# Patient Record
Sex: MALE | Race: WHITE | Employment: OTHER | URBAN - METROPOLITAN AREA
[De-identification: names, ages, dates, MRNs, and addresses within clinical notes are randomized per-mention and may not be internally consistent; named-entity substitution may affect disease eponyms.]

---

## 2022-07-06 ENCOUNTER — APPOINTMENT (OUTPATIENT)
Dept: GENERAL RADIOLOGY | Age: 57
End: 2022-07-06
Payer: COMMERCIAL

## 2022-07-06 ENCOUNTER — HOSPITAL ENCOUNTER (EMERGENCY)
Age: 57
Discharge: HOME | End: 2022-07-06
Attending: EMERGENCY MEDICINE
Payer: COMMERCIAL

## 2022-07-06 VITALS
BODY MASS INDEX: 34.65 KG/M2 | TEMPERATURE: 98 F | HEIGHT: 74 IN | DIASTOLIC BLOOD PRESSURE: 80 MMHG | SYSTOLIC BLOOD PRESSURE: 155 MMHG | WEIGHT: 270 LBS | HEART RATE: 90 BPM | RESPIRATION RATE: 16 BRPM | OXYGEN SATURATION: 95 %

## 2022-07-06 DIAGNOSIS — J98.6 PARALYSIS OF DIAPHRAGM: Primary | ICD-10-CM

## 2022-07-06 LAB
ALBUMIN SERPL-MCNC: 4.5 G/DL (ref 3.5–5.2)
ALP BLD-CCNC: 64 U/L (ref 40–129)
ALT SERPL-CCNC: 19 U/L (ref 0–40)
ANION GAP SERPL CALCULATED.3IONS-SCNC: 13 MMOL/L (ref 7–16)
AST SERPL-CCNC: 22 U/L (ref 0–39)
BASOPHILS ABSOLUTE: 0.04 E9/L (ref 0–0.2)
BASOPHILS RELATIVE PERCENT: 0.6 % (ref 0–2)
BILIRUB SERPL-MCNC: 0.3 MG/DL (ref 0–1.2)
BUN BLDV-MCNC: 27 MG/DL (ref 6–20)
CALCIUM SERPL-MCNC: 9.9 MG/DL (ref 8.6–10.2)
CHLORIDE BLD-SCNC: 107 MMOL/L (ref 98–107)
CO2: 23 MMOL/L (ref 22–29)
CREAT SERPL-MCNC: 1.2 MG/DL (ref 0.7–1.2)
D DIMER: <200 NG/ML DDU
EKG ATRIAL RATE: 91 BPM
EKG P AXIS: 65 DEGREES
EKG P-R INTERVAL: 240 MS
EKG Q-T INTERVAL: 388 MS
EKG QRS DURATION: 128 MS
EKG QTC CALCULATION (BAZETT): 477 MS
EKG R AXIS: 19 DEGREES
EKG T AXIS: 43 DEGREES
EKG VENTRICULAR RATE: 91 BPM
EOSINOPHILS ABSOLUTE: 0.21 E9/L (ref 0.05–0.5)
EOSINOPHILS RELATIVE PERCENT: 3.1 % (ref 0–6)
GFR AFRICAN AMERICAN: >60
GFR NON-AFRICAN AMERICAN: >60 ML/MIN/1.73
GLUCOSE BLD-MCNC: 146 MG/DL (ref 74–99)
HCT VFR BLD CALC: 46.4 % (ref 37–54)
HEMOGLOBIN: 15.3 G/DL (ref 12.5–16.5)
IMMATURE GRANULOCYTES #: 0.02 E9/L
IMMATURE GRANULOCYTES %: 0.3 % (ref 0–5)
LYMPHOCYTES ABSOLUTE: 1.9 E9/L (ref 1.5–4)
LYMPHOCYTES RELATIVE PERCENT: 27.8 % (ref 20–42)
MCH RBC QN AUTO: 30.3 PG (ref 26–35)
MCHC RBC AUTO-ENTMCNC: 33 % (ref 32–34.5)
MCV RBC AUTO: 91.9 FL (ref 80–99.9)
MONOCYTES ABSOLUTE: 0.69 E9/L (ref 0.1–0.95)
MONOCYTES RELATIVE PERCENT: 10.1 % (ref 2–12)
NEUTROPHILS ABSOLUTE: 3.98 E9/L (ref 1.8–7.3)
NEUTROPHILS RELATIVE PERCENT: 58.1 % (ref 43–80)
PDW BLD-RTO: 12.9 FL (ref 11.5–15)
PLATELET # BLD: 260 E9/L (ref 130–450)
PMV BLD AUTO: 10.6 FL (ref 7–12)
POTASSIUM SERPL-SCNC: 4.3 MMOL/L (ref 3.5–5)
RBC # BLD: 5.05 E12/L (ref 3.8–5.8)
SARS-COV-2, NAAT: NOT DETECTED
SODIUM BLD-SCNC: 143 MMOL/L (ref 132–146)
TOTAL PROTEIN: 7.6 G/DL (ref 6.4–8.3)
TROPONIN, HIGH SENSITIVITY: 12 NG/L (ref 0–11)
TROPONIN, HIGH SENSITIVITY: 12 NG/L (ref 0–11)
WBC # BLD: 6.8 E9/L (ref 4.5–11.5)

## 2022-07-06 PROCEDURE — 99285 EMERGENCY DEPT VISIT HI MDM: CPT

## 2022-07-06 PROCEDURE — 85025 COMPLETE CBC W/AUTO DIFF WBC: CPT

## 2022-07-06 PROCEDURE — 80053 COMPREHEN METABOLIC PANEL: CPT

## 2022-07-06 PROCEDURE — 94664 DEMO&/EVAL PT USE INHALER: CPT

## 2022-07-06 PROCEDURE — 71046 X-RAY EXAM CHEST 2 VIEWS: CPT

## 2022-07-06 PROCEDURE — 87635 SARS-COV-2 COVID-19 AMP PRB: CPT

## 2022-07-06 PROCEDURE — 93005 ELECTROCARDIOGRAM TRACING: CPT | Performed by: NURSE PRACTITIONER

## 2022-07-06 PROCEDURE — 84484 ASSAY OF TROPONIN QUANT: CPT

## 2022-07-06 PROCEDURE — 6370000000 HC RX 637 (ALT 250 FOR IP): Performed by: STUDENT IN AN ORGANIZED HEALTH CARE EDUCATION/TRAINING PROGRAM

## 2022-07-06 PROCEDURE — 85378 FIBRIN DEGRADE SEMIQUANT: CPT

## 2022-07-06 RX ORDER — INSULIN DEGLUDEC INJECTION 100 U/ML
INJECTION, SOLUTION SUBCUTANEOUS
COMMUNITY

## 2022-07-06 RX ORDER — ATORVASTATIN CALCIUM 20 MG/1
20 TABLET, FILM COATED ORAL DAILY
COMMUNITY

## 2022-07-06 RX ORDER — ICOSAPENT ETHYL 1000 MG/1
2 CAPSULE ORAL 2 TIMES DAILY
COMMUNITY

## 2022-07-06 RX ORDER — ALBUTEROL SULFATE 90 UG/1
2 AEROSOL, METERED RESPIRATORY (INHALATION) 4 TIMES DAILY PRN
Qty: 18 G | Refills: 0 | Status: SHIPPED | OUTPATIENT
Start: 2022-07-06

## 2022-07-06 RX ORDER — VALSARTAN 160 MG/1
160 TABLET ORAL DAILY
COMMUNITY

## 2022-07-06 RX ORDER — FENOFIBRATE 160 MG/1
135 TABLET ORAL DAILY
COMMUNITY

## 2022-07-06 RX ORDER — IPRATROPIUM BROMIDE AND ALBUTEROL SULFATE 2.5; .5 MG/3ML; MG/3ML
1 SOLUTION RESPIRATORY (INHALATION)
Status: DISCONTINUED | OUTPATIENT
Start: 2022-07-06 | End: 2022-07-06 | Stop reason: HOSPADM

## 2022-07-06 RX ADMIN — IPRATROPIUM BROMIDE AND ALBUTEROL SULFATE 1 AMPULE: 2.5; .5 SOLUTION RESPIRATORY (INHALATION) at 15:33

## 2022-07-06 ASSESSMENT — ENCOUNTER SYMPTOMS
SHORTNESS OF BREATH: 1
SORE THROAT: 0
SINUS PRESSURE: 0
SINUS PAIN: 0
NAUSEA: 0
ABDOMINAL PAIN: 0
CONSTIPATION: 0
VOMITING: 0
CHEST TIGHTNESS: 0
EYE PAIN: 0
DIARRHEA: 0
EYE REDNESS: 0
COUGH: 1

## 2022-07-06 ASSESSMENT — PAIN - FUNCTIONAL ASSESSMENT: PAIN_FUNCTIONAL_ASSESSMENT: NONE - DENIES PAIN

## 2022-07-06 NOTE — ED PROVIDER NOTES
Piyush Garcia [address] [address]  Department of Emergency Medicine     Written by: Micky Davis DO  Patient Name: Wyatt Eisenmenger  Attending Provider: Tutu Newman MD  Admit Date: 2022  1:45 PM  MRN: [id number]                   : 1965        Chief Complaint   Patient presents with    Shortness of Breath     sob since April, worsening over the weekend, recently started z pack, pt states spo2 80% this weekend    - Chief complaint    Mr. Calin Cartwright is a 60year old male who presents to the ED due to shortness of breath. Patient states he has had progressively worsening shortness of breath since April. Notes that he also began having right-sided neck pain around the same time as his shortness of breath began. He says that he took a trip to [facility] and mid  and his symptoms were persistent throughout the trip. He states that they progressively worsened over the weekend and he was prescribed a 3-day Z-Noe which did not improve his symptoms. States that he has been using his wife's old inhaler with minor improvement in his symptoms. Patient endorses a mild cough that began over the past weekend. Patient symptoms been constant since onset. States they are aggravated when he lays on his left side and back as well as with exertion and relieved by nothing. Denies any fevers, chills, nausea, vomiting, chest pain, abdominal pain, bowel urinary changes, headaches or vision changes. Review of Systems   Constitutional: Negative for chills, fatigue and fever. HENT: Negative for congestion, sinus pressure, sinus pain and sore throat. Eyes: Negative for pain, redness and visual disturbance. Respiratory: Positive for cough and shortness of breath. Negative for chest tightness. Cardiovascular: Negative for chest pain, palpitations and leg swelling. Gastrointestinal: Negative for abdominal pain, constipation, diarrhea, nausea and vomiting.    Genitourinary: Negative for dysuria, flank pain, frequency, hematuria and urgency. Musculoskeletal: Positive for neck pain (Right sided). Negative for joint swelling. Skin: Negative for rash. Neurological: Negative for dizziness, tremors, weakness, light-headedness, numbness and headaches. Physical Exam  Constitutional:       General: He is not in acute distress. Appearance: Normal appearance. He is well-developed and normal weight. He is not ill-appearing. HENT:      Head: Normocephalic and atraumatic. Right Ear: External ear normal.      Left Ear: External ear normal.      Mouth/Throat:      Mouth: Mucous membranes are moist.      Pharynx: Oropharynx is clear. Eyes:      Extraocular Movements: Extraocular movements intact. Conjunctiva/sclera: Conjunctivae normal.   Neck:      Comments: Right paraspinal neck tenderness  Cardiovascular:      Rate and Rhythm: Regular rhythm. Tachycardia present. Pulses: Normal pulses. Heart sounds: Normal heart sounds. Pulmonary:      Effort: Pulmonary effort is normal. No tachypnea or respiratory distress. Breath sounds: Wheezing present. No decreased breath sounds. Abdominal:      General: Abdomen is flat. Bowel sounds are normal.      Palpations: Abdomen is soft. Tenderness: There is no abdominal tenderness. There is no guarding or rebound. Musculoskeletal:         General: Normal range of motion. Cervical back: Normal range of motion and neck supple. Right lower leg: No edema. Left lower leg: No edema. Skin:     General: Skin is warm and dry. Neurological:      General: No focal deficit present. Mental Status: He is alert and oriented to person, place, and time. Mental status is at baseline. Motor: No weakness. Psychiatric:         Mood and Affect: Mood normal.         Behavior: Behavior normal.          Procedures     BEDSIDE LUNG ULTRASOUND    Risks, benefits and alternatives (for applicable procedures below) described.    Performed By: Luke Becerra MD and Bri Torres DO. Indication: X-ray indicating diaphragmatic paralysis  Informed consent: The patient provided verbal consent for this procedure. .  Procedural Quadrants/Interpretations:     Right Diaphragm: Limited excursion  Left Diaphragm: Normal excursion      MDM  Number of Diagnoses or Management Options  Paralysis of diaphragm  Diagnosis management comments: This is a 60year old male who presents to the ED due to shortness of breath. Patient CBC and CMP were both markedly benign within normal limits. Initial and repeat troponin were both 12. D-dimer is less than 200. Patient's COVID test was negative. Chest x-ray revealed an elevated right hemidiaphragm which may relate to eventration or paralysis with adjacent mild atelectasis and a calcified granuloma in the left lung. Bedside ultrasound was obtained which revealed limited excursion of the right diaphragm when compared to the left. Dr. Karen Scott was covering for patient's PCP and was informed of these findings and the necessity for patient to receive an outpatient cervical MRI. Patient was not hypoxic throughout his stay and appeared to be in no acute distress throughout his time in the ED. Patient received a DuoNeb treatment due to minor diffuse wheezing and reported some initial relief of his symptoms. He will be sent home with an albuterol inhaler for symptomatic relief. He has been told to be sure to follow-up with his primary care physician on Monday when he returns from vacation. He has been informed to come back to the ED if symptoms return, worsen or change anytime.              ED Course as of 07/06/22 1612   Wed Jul 06, 2022   5107 Spoke with Dr. Dalila Frausto for Dr. Juan Yung who stated he will be back in town on Monday and to schedule a follow up appointment for then to schedule his outpatient cervical MRI. [JS]      ED Course User Index  [JS] Bri Torres DO       --------------------------------------------- PAST HISTORY ---------------------------------------------  Past Medical History:  has no past medical history on file. Past Surgical History:  has no past surgical history on file. Social History:  reports that he has never smoked. He has never used smokeless tobacco.    Family History: family history is not on file. The patients home medications have been reviewed. Allergies: Patient has no known allergies. -------------------------------------------------- RESULTS -------------------------------------------------  EKG: This EKG is signed and interpreted by me.     Rate: 91  Rhythm: Sinus  Interpretation: 1st degree AV block  Comparison: no previous EKG available    Labs:  Results for orders placed or performed during the hospital encounter of 07/06/22   COVID-19, Rapid    Specimen: Nasopharyngeal Swab   Result Value Ref Range    SARS-CoV-2, NAAT Not Detected Not Detected   CBC with Auto Differential   Result Value Ref Range    WBC 6.8 4.5 - 11.5 E9/L    RBC 5.05 3.80 - 5.80 E12/L    Hemoglobin 15.3 12.5 - 16.5 g/dL    Hematocrit 46.4 37.0 - 54.0 %    MCV 91.9 80.0 - 99.9 fL    MCH 30.3 26.0 - 35.0 pg    MCHC 33.0 32.0 - 34.5 %    RDW 12.9 11.5 - 15.0 fL    Platelets 125 925 - 496 E9/L    MPV 10.6 7.0 - 12.0 fL    Neutrophils % 58.1 43.0 - 80.0 %    Immature Granulocytes % 0.3 0.0 - 5.0 %    Lymphocytes % 27.8 20.0 - 42.0 %    Monocytes % 10.1 2.0 - 12.0 %    Eosinophils % 3.1 0.0 - 6.0 %    Basophils % 0.6 0.0 - 2.0 %    Neutrophils Absolute 3.98 1.80 - 7.30 E9/L    Immature Granulocytes # 0.02 E9/L    Lymphocytes Absolute 1.90 1.50 - 4.00 E9/L    Monocytes Absolute 0.69 0.10 - 0.95 E9/L    Eosinophils Absolute 0.21 0.05 - 0.50 E9/L    Basophils Absolute 0.04 0.00 - 0.20 E9/L   Comprehensive Metabolic Panel   Result Value Ref Range    Sodium 143 132 - 146 mmol/L    Potassium 4.3 3.5 - 5.0 mmol/L    Chloride 107 98 - 107 mmol/L    CO2 23 22 - 29 mmol/L    Anion Gap 13 7 - 16 mmol/L    Glucose 146 (H) 74 - 99 mg/dL    BUN 27 (H) 6 - 20 mg/dL    CREATININE 1.2 0.7 - 1.2 mg/dL    GFR Non-African American >60 >=60 mL/min/1.73    GFR African American >60     Calcium 9.9 8.6 - 10.2 mg/dL    Total Protein 7.6 6.4 - 8.3 g/dL    Albumin 4.5 3.5 - 5.2 g/dL    Total Bilirubin 0.3 0.0 - 1.2 mg/dL    Alkaline Phosphatase 64 40 - 129 U/L    ALT 19 0 - 40 U/L    AST 22 0 - 39 U/L   Troponin   Result Value Ref Range    Troponin, High Sensitivity 12 (H) 0 - 11 ng/L   D-Dimer, Quantitative   Result Value Ref Range    D-Dimer, Quant <200 ng/mL DDU   Troponin   Result Value Ref Range    Troponin, High Sensitivity 12 (H) 0 - 11 ng/L   EKG 12 Lead   Result Value Ref Range    Ventricular Rate 91 BPM    Atrial Rate 91 BPM    P-R Interval 240 ms    QRS Duration 128 ms    Q-T Interval 388 ms    QTc Calculation (Bazett) 477 ms    P Axis 65 degrees    R Axis 19 degrees    T Axis 43 degrees       Radiology:  XR CHEST (2 VW)   Final Result   Elevated right hemidiaphragm which may relate to eventration and or   paralysis. Adjacent mild atelectasis. Calcified granuloma in the left lung.             ------------------------- NURSING NOTES AND VITALS REVIEWED ---------------------------  Date / Time Roomed:  7/6/2022  1:45 PM  ED Bed Assignment:  13/13    The nursing notes within the ED encounter and vital signs as below have been reviewed. BP (!) 155/80   Pulse 90   Temp 98 °F (36.7 °C) (Oral)   Resp 16   Ht 6' 2\" (1.88 m)   Wt 270 lb (122.5 kg)   SpO2 95%   BMI 34.67 kg/m²   Oxygen Saturation Interpretation: Normal      ------------------------------------------ PROGRESS NOTES ------------------------------------------  4:12 PM EDT  I have spoken with the patient and discussed todays results, in addition to providing specific details for the plan of care and counseling regarding the diagnosis and prognosis. Their questions are answered at this time and they are agreeable with the plan.  I discussed at length with them reasons for immediate return here for re evaluation. They will followup with their primary care physician by calling their office on Monday.      --------------------------------- ADDITIONAL PROVIDER NOTES ---------------------------------  At this time the patient is without objective evidence of an acute process requiring hospitalization or inpatient management. They have remained hemodynamically stable throughout their entire ED visit and are stable for discharge with outpatient follow-up. The plan has been discussed in detail and they are aware of the specific conditions for emergent return, as well as the importance of follow-up. Discharge Medication List as of 7/6/2022  3:35 PM          Diagnosis:  1. Paralysis of diaphragm        Disposition:  Patient's disposition: Discharge to home  Patient's condition is stable. Patient was seen and evaluated by myself and my attending Chris Odom MD. Assessment and Plan discussed with attending provider, please see attestation for final plan of care.      DO Bri Edmond DO  Resident  07/06/22 9133

## 2022-07-06 NOTE — ED NOTES
Department of Emergency Medicine  FIRST PROVIDER TRIAGE NOTE             Independent MLP           7/6/22  11:01 AM EDT    Date of Encounter: 7/6/22   MRN: [id number]      HPI: Cruz Fischre is a 60 y.o. male who presents to the ED for Shortness of Breath (sob since April, worsening over the weekend, recently started z pack, pt states spo2 80% this weekend)    ROS: Negative for abd pain or back pain. PE: Gen Appearance/Constitutional: alert  Musculoskeletal: moves all extremities x 4     Initial Plan of Care: All treatment areas with department are currently occupied. Plan to order/Initiate the following while awaiting opening in ED: labs, EKG and imaging studies.   Initiate Treatment-Testing, Proceed toTreatment Area When Bed Available for ED Attending/SAMANTHAP to Continue Care    Electronically signed by JOHN Otoole CNP   DD: 7/6/22       JOHN Borjas CNP  07/06/22 1106

## 2023-10-14 ENCOUNTER — HOSPITAL ENCOUNTER (OUTPATIENT)
Age: 58
Setting detail: OBSERVATION
Discharge: HOME | End: 2023-10-15
Attending: EMERGENCY MEDICINE | Admitting: STUDENT IN AN ORGANIZED HEALTH CARE EDUCATION/TRAINING PROGRAM
Payer: COMMERCIAL

## 2023-10-14 ENCOUNTER — APPOINTMENT (OUTPATIENT)
Dept: GENERAL RADIOLOGY | Age: 58
End: 2023-10-14
Payer: COMMERCIAL

## 2023-10-14 DIAGNOSIS — I24.9 ACUTE CORONARY SYNDROME (HCC): Primary | ICD-10-CM

## 2023-10-14 LAB
ALBUMIN SERPL-MCNC: 4.2 G/DL (ref 3.5–5.2)
ALP SERPL-CCNC: 54 U/L (ref 40–129)
ALT SERPL-CCNC: 26 U/L (ref 0–40)
ANION GAP SERPL CALCULATED.3IONS-SCNC: 12 MMOL/L (ref 7–16)
AST SERPL-CCNC: 32 U/L (ref 0–39)
BASOPHILS # BLD: 0.03 K/UL (ref 0–0.2)
BASOPHILS NFR BLD: 0 % (ref 0–2)
BILIRUB SERPL-MCNC: 0.4 MG/DL (ref 0–1.2)
BUN SERPL-MCNC: 24 MG/DL (ref 6–23)
CALCIUM SERPL-MCNC: 9.4 MG/DL (ref 8.6–10.2)
CHLORIDE SERPL-SCNC: 105 MMOL/L (ref 98–107)
CO2 SERPL-SCNC: 24 MMOL/L (ref 22–29)
CREAT SERPL-MCNC: 1.5 MG/DL (ref 0.7–1.2)
EOSINOPHIL # BLD: 0.13 K/UL (ref 0.05–0.5)
EOSINOPHILS RELATIVE PERCENT: 2 % (ref 0–6)
ERYTHROCYTE [DISTWIDTH] IN BLOOD BY AUTOMATED COUNT: 13.2 % (ref 11.5–15)
GFR SERPL CREATININE-BSD FRML MDRD: 53 ML/MIN/1.73M2
GLUCOSE SERPL-MCNC: 144 MG/DL (ref 74–99)
HCT VFR BLD AUTO: 44.8 % (ref 37–54)
HGB BLD-MCNC: 14.6 G/DL (ref 12.5–16.5)
IMM GRANULOCYTES # BLD AUTO: <0.03 K/UL (ref 0–0.58)
IMM GRANULOCYTES NFR BLD: 0 % (ref 0–5)
INR PPP: 1
LYMPHOCYTES NFR BLD: 1.8 K/UL (ref 1.5–4)
LYMPHOCYTES RELATIVE PERCENT: 22 % (ref 20–42)
MCH RBC QN AUTO: 30.6 PG (ref 26–35)
MCHC RBC AUTO-ENTMCNC: 32.6 G/DL (ref 32–34.5)
MCV RBC AUTO: 93.9 FL (ref 80–99.9)
MONOCYTES NFR BLD: 1.06 K/UL (ref 0.1–0.95)
MONOCYTES NFR BLD: 13 % (ref 2–12)
NEUTROPHILS NFR BLD: 63 % (ref 43–80)
NEUTS SEG NFR BLD: 5.18 K/UL (ref 1.8–7.3)
PARTIAL THROMBOPLASTIN TIME: 33.9 SEC (ref 24.5–35.1)
PARTIAL THROMBOPLASTIN TIME: 52.8 SEC (ref 24.5–35.1)
PLATELET # BLD AUTO: 251 K/UL (ref 130–450)
PMV BLD AUTO: 10.6 FL (ref 7–12)
POTASSIUM SERPL-SCNC: 4.3 MMOL/L (ref 3.5–5)
PROT SERPL-MCNC: 7.1 G/DL (ref 6.4–8.3)
PROTHROMBIN TIME: 10.5 SEC (ref 9.3–12.4)
RBC # BLD AUTO: 4.77 M/UL (ref 3.8–5.8)
SODIUM SERPL-SCNC: 141 MMOL/L (ref 132–146)
TROPONIN I SERPL HS-MCNC: 17 NG/L (ref 0–11)
TROPONIN I SERPL HS-MCNC: 17 NG/L (ref 0–11)
WBC OTHER # BLD: 8.2 K/UL (ref 4.5–11.5)

## 2023-10-14 PROCEDURE — 2580000003 HC RX 258: Performed by: EMERGENCY MEDICINE

## 2023-10-14 PROCEDURE — 93005 ELECTROCARDIOGRAM TRACING: CPT | Performed by: EMERGENCY MEDICINE

## 2023-10-14 PROCEDURE — 71045 X-RAY EXAM CHEST 1 VIEW: CPT

## 2023-10-14 PROCEDURE — 6370000000 HC RX 637 (ALT 250 FOR IP): Performed by: NURSE PRACTITIONER

## 2023-10-14 PROCEDURE — 2580000003 HC RX 258: Performed by: NURSE PRACTITIONER

## 2023-10-14 PROCEDURE — 96374 THER/PROPH/DIAG INJ IV PUSH: CPT

## 2023-10-14 PROCEDURE — 96375 TX/PRO/DX INJ NEW DRUG ADDON: CPT

## 2023-10-14 PROCEDURE — 85610 PROTHROMBIN TIME: CPT

## 2023-10-14 PROCEDURE — 85730 THROMBOPLASTIN TIME PARTIAL: CPT

## 2023-10-14 PROCEDURE — 85025 COMPLETE CBC W/AUTO DIFF WBC: CPT

## 2023-10-14 PROCEDURE — 96376 TX/PRO/DX INJ SAME DRUG ADON: CPT

## 2023-10-14 PROCEDURE — 84484 ASSAY OF TROPONIN QUANT: CPT

## 2023-10-14 PROCEDURE — 96368 THER/DIAG CONCURRENT INF: CPT

## 2023-10-14 PROCEDURE — 80053 COMPREHEN METABOLIC PANEL: CPT

## 2023-10-14 PROCEDURE — 96366 THER/PROPH/DIAG IV INF ADDON: CPT

## 2023-10-14 PROCEDURE — 6360000002 HC RX W HCPCS: Performed by: EMERGENCY MEDICINE

## 2023-10-14 PROCEDURE — G0378 HOSPITAL OBSERVATION PER HR: HCPCS

## 2023-10-14 PROCEDURE — 99285 EMERGENCY DEPT VISIT HI MDM: CPT

## 2023-10-14 PROCEDURE — 96365 THER/PROPH/DIAG IV INF INIT: CPT

## 2023-10-14 RX ORDER — HEPARIN SODIUM 1000 [USP'U]/ML
2000 INJECTION, SOLUTION INTRAVENOUS; SUBCUTANEOUS PRN
Status: DISCONTINUED | OUTPATIENT
Start: 2023-10-14 | End: 2023-10-15

## 2023-10-14 RX ORDER — SODIUM CHLORIDE 0.9 % (FLUSH) 0.9 %
10 SYRINGE (ML) INJECTION EVERY 12 HOURS SCHEDULED
Status: DISCONTINUED | OUTPATIENT
Start: 2023-10-14 | End: 2023-10-15 | Stop reason: HOSPADM

## 2023-10-14 RX ORDER — SODIUM CHLORIDE 0.9 % (FLUSH) 0.9 %
10 SYRINGE (ML) INJECTION PRN
Status: DISCONTINUED | OUTPATIENT
Start: 2023-10-14 | End: 2023-10-15 | Stop reason: HOSPADM

## 2023-10-14 RX ORDER — ACETAMINOPHEN 325 MG/1
650 TABLET ORAL EVERY 6 HOURS PRN
Status: DISCONTINUED | OUTPATIENT
Start: 2023-10-14 | End: 2023-10-15 | Stop reason: HOSPADM

## 2023-10-14 RX ORDER — HEPARIN SODIUM 1000 [USP'U]/ML
4000 INJECTION, SOLUTION INTRAVENOUS; SUBCUTANEOUS PRN
Status: DISCONTINUED | OUTPATIENT
Start: 2023-10-14 | End: 2023-10-15

## 2023-10-14 RX ORDER — ACETAMINOPHEN 650 MG/1
650 SUPPOSITORY RECTAL EVERY 6 HOURS PRN
Status: DISCONTINUED | OUTPATIENT
Start: 2023-10-14 | End: 2023-10-15 | Stop reason: HOSPADM

## 2023-10-14 RX ORDER — POTASSIUM CHLORIDE 7.45 MG/ML
10 INJECTION INTRAVENOUS PRN
Status: DISCONTINUED | OUTPATIENT
Start: 2023-10-14 | End: 2023-10-15 | Stop reason: HOSPADM

## 2023-10-14 RX ORDER — FENTANYL CITRATE 50 UG/ML
50 INJECTION, SOLUTION INTRAMUSCULAR; INTRAVENOUS ONCE
Status: COMPLETED | OUTPATIENT
Start: 2023-10-14 | End: 2023-10-14

## 2023-10-14 RX ORDER — POTASSIUM CHLORIDE 20 MEQ/1
40 TABLET, EXTENDED RELEASE ORAL PRN
Status: DISCONTINUED | OUTPATIENT
Start: 2023-10-14 | End: 2023-10-15 | Stop reason: HOSPADM

## 2023-10-14 RX ORDER — ONDANSETRON 4 MG/1
4 TABLET, ORALLY DISINTEGRATING ORAL EVERY 8 HOURS PRN
Status: DISCONTINUED | OUTPATIENT
Start: 2023-10-14 | End: 2023-10-15 | Stop reason: HOSPADM

## 2023-10-14 RX ORDER — ALBUTEROL SULFATE 2.5 MG/3ML
2.5 SOLUTION RESPIRATORY (INHALATION) 4 TIMES DAILY PRN
Status: DISCONTINUED | OUTPATIENT
Start: 2023-10-14 | End: 2023-10-15 | Stop reason: HOSPADM

## 2023-10-14 RX ORDER — ONDANSETRON 2 MG/ML
4 INJECTION INTRAMUSCULAR; INTRAVENOUS EVERY 6 HOURS PRN
Status: DISCONTINUED | OUTPATIENT
Start: 2023-10-14 | End: 2023-10-15 | Stop reason: HOSPADM

## 2023-10-14 RX ORDER — ASPIRIN 81 MG/1
324 TABLET, CHEWABLE ORAL ONCE
Status: DISCONTINUED | OUTPATIENT
Start: 2023-10-14 | End: 2023-10-14

## 2023-10-14 RX ORDER — 0.9 % SODIUM CHLORIDE 0.9 %
500 INTRAVENOUS SOLUTION INTRAVENOUS ONCE
Status: COMPLETED | OUTPATIENT
Start: 2023-10-14 | End: 2023-10-14

## 2023-10-14 RX ORDER — SODIUM CHLORIDE 9 MG/ML
INJECTION, SOLUTION INTRAVENOUS PRN
Status: DISCONTINUED | OUTPATIENT
Start: 2023-10-14 | End: 2023-10-15 | Stop reason: HOSPADM

## 2023-10-14 RX ORDER — HEPARIN SODIUM 10000 [USP'U]/100ML
5-30 INJECTION, SOLUTION INTRAVENOUS CONTINUOUS
Status: DISCONTINUED | OUTPATIENT
Start: 2023-10-14 | End: 2023-10-15

## 2023-10-14 RX ORDER — SENNOSIDES A AND B 8.6 MG/1
1 TABLET, FILM COATED ORAL DAILY PRN
Status: DISCONTINUED | OUTPATIENT
Start: 2023-10-14 | End: 2023-10-15 | Stop reason: HOSPADM

## 2023-10-14 RX ORDER — ASPIRIN 81 MG/1
81 TABLET, CHEWABLE ORAL DAILY
COMMUNITY

## 2023-10-14 RX ORDER — VALSARTAN 160 MG/1
160 TABLET ORAL DAILY
Status: DISCONTINUED | OUTPATIENT
Start: 2023-10-15 | End: 2023-10-15 | Stop reason: HOSPADM

## 2023-10-14 RX ORDER — HEPARIN SODIUM 1000 [USP'U]/ML
4000 INJECTION, SOLUTION INTRAVENOUS; SUBCUTANEOUS ONCE
Status: COMPLETED | OUTPATIENT
Start: 2023-10-14 | End: 2023-10-14

## 2023-10-14 RX ORDER — ICOSAPENT ETHYL 1000 MG/1
2 CAPSULE ORAL 2 TIMES DAILY
Status: DISCONTINUED | OUTPATIENT
Start: 2023-10-14 | End: 2023-10-15 | Stop reason: RX

## 2023-10-14 RX ORDER — NITROGLYCERIN 20 MG/100ML
5-200 INJECTION INTRAVENOUS CONTINUOUS
Status: DISCONTINUED | OUTPATIENT
Start: 2023-10-14 | End: 2023-10-15

## 2023-10-14 RX ORDER — FENOFIBRATE 54 MG/1
135 TABLET ORAL DAILY
Status: DISCONTINUED | OUTPATIENT
Start: 2023-10-15 | End: 2023-10-15 | Stop reason: HOSPADM

## 2023-10-14 RX ORDER — INSULIN GLARGINE 100 [IU]/ML
60 INJECTION, SOLUTION SUBCUTANEOUS DAILY
Status: DISCONTINUED | OUTPATIENT
Start: 2023-10-15 | End: 2023-10-15 | Stop reason: HOSPADM

## 2023-10-14 RX ORDER — ATORVASTATIN CALCIUM 40 MG/1
40 TABLET, FILM COATED ORAL DAILY
Status: DISCONTINUED | OUTPATIENT
Start: 2023-10-15 | End: 2023-10-15 | Stop reason: HOSPADM

## 2023-10-14 RX ORDER — MAGNESIUM SULFATE IN WATER 40 MG/ML
2000 INJECTION, SOLUTION INTRAVENOUS PRN
Status: DISCONTINUED | OUTPATIENT
Start: 2023-10-14 | End: 2023-10-15 | Stop reason: HOSPADM

## 2023-10-14 RX ADMIN — HEPARIN SODIUM 4000 UNITS: 1000 INJECTION INTRAVENOUS; SUBCUTANEOUS at 19:09

## 2023-10-14 RX ADMIN — ACETAMINOPHEN 650 MG: 325 TABLET ORAL at 21:52

## 2023-10-14 RX ADMIN — NITROGLYCERIN 5 MCG/MIN: 20 INJECTION INTRAVENOUS at 20:27

## 2023-10-14 RX ADMIN — FENTANYL CITRATE 50 MCG: 50 INJECTION INTRAMUSCULAR; INTRAVENOUS at 19:27

## 2023-10-14 RX ADMIN — HEPARIN SODIUM AND DEXTROSE 8 UNITS/KG/HR: 10000; 5 INJECTION INTRAVENOUS at 20:25

## 2023-10-14 RX ADMIN — Medication 10 ML: at 21:37

## 2023-10-14 RX ADMIN — SODIUM CHLORIDE 500 ML: 9 INJECTION, SOLUTION INTRAVENOUS at 19:28

## 2023-10-14 ASSESSMENT — PAIN DESCRIPTION - LOCATION
LOCATION: CHEST
LOCATION: CHEST
LOCATION: HEAD

## 2023-10-14 ASSESSMENT — PAIN SCALES - GENERAL
PAINLEVEL_OUTOF10: 0
PAINLEVEL_OUTOF10: 1
PAINLEVEL_OUTOF10: 3
PAINLEVEL_OUTOF10: 8
PAINLEVEL_OUTOF10: 3

## 2023-10-14 NOTE — ED NOTES
Time Heart Alert called:1902    Cardiology paged: [facility]    Cardiology call back:1904 here     Patient to Cath Lab:      Luda Najera  10/14/23 1912

## 2023-10-15 ENCOUNTER — APPOINTMENT (OUTPATIENT)
Dept: NUCLEAR MEDICINE | Age: 58
End: 2023-10-15
Payer: COMMERCIAL

## 2023-10-15 VITALS
HEART RATE: 86 BPM | TEMPERATURE: 98 F | SYSTOLIC BLOOD PRESSURE: 117 MMHG | BODY MASS INDEX: 36.78 KG/M2 | OXYGEN SATURATION: 95 % | RESPIRATION RATE: 20 BRPM | DIASTOLIC BLOOD PRESSURE: 84 MMHG | WEIGHT: 286.6 LBS | HEIGHT: 74 IN

## 2023-10-15 PROBLEM — I45.10 RIGHT BUNDLE BRANCH BLOCK: Status: ACTIVE | Noted: 2023-10-15

## 2023-10-15 LAB
ALBUMIN SERPL-MCNC: 3.7 G/DL (ref 3.5–5.2)
ALP SERPL-CCNC: 49 U/L (ref 40–129)
ALT SERPL-CCNC: 26 U/L (ref 0–40)
ANION GAP SERPL CALCULATED.3IONS-SCNC: 12 MMOL/L (ref 7–16)
AST SERPL-CCNC: 29 U/L (ref 0–39)
BASOPHILS # BLD: 0.03 K/UL (ref 0–0.2)
BASOPHILS NFR BLD: 1 % (ref 0–2)
BILIRUB SERPL-MCNC: 0.4 MG/DL (ref 0–1.2)
BUN SERPL-MCNC: 20 MG/DL (ref 6–23)
CALCIUM SERPL-MCNC: 8.9 MG/DL (ref 8.6–10.2)
CHLORIDE SERPL-SCNC: 106 MMOL/L (ref 98–107)
CHOLEST SERPL-MCNC: 159 MG/DL
CO2 SERPL-SCNC: 22 MMOL/L (ref 22–29)
CREAT SERPL-MCNC: 1.2 MG/DL (ref 0.7–1.2)
D DIMER: 246 NG/ML DDU (ref 0–232)
EKG ATRIAL RATE: 96 BPM
EKG P AXIS: 57 DEGREES
EKG P-R INTERVAL: 244 MS
EKG Q-T INTERVAL: 372 MS
EKG QRS DURATION: 150 MS
EKG QTC CALCULATION (BAZETT): 469 MS
EKG R AXIS: 34 DEGREES
EKG T AXIS: 40 DEGREES
EKG VENTRICULAR RATE: 96 BPM
EOSINOPHIL # BLD: 0.2 K/UL (ref 0.05–0.5)
EOSINOPHILS RELATIVE PERCENT: 3 % (ref 0–6)
ERYTHROCYTE [DISTWIDTH] IN BLOOD BY AUTOMATED COUNT: 13.2 % (ref 11.5–15)
GFR SERPL CREATININE-BSD FRML MDRD: >60 ML/MIN/1.73M2
GLUCOSE SERPL-MCNC: 99 MG/DL (ref 74–99)
HCT VFR BLD AUTO: 38.9 % (ref 37–54)
HDLC SERPL-MCNC: 44 MG/DL
HGB BLD-MCNC: 13 G/DL (ref 12.5–16.5)
IMM GRANULOCYTES # BLD AUTO: 0.03 K/UL (ref 0–0.58)
IMM GRANULOCYTES NFR BLD: 1 % (ref 0–5)
LDLC SERPL CALC-MCNC: 75 MG/DL
LYMPHOCYTES NFR BLD: 1.39 K/UL (ref 1.5–4)
LYMPHOCYTES RELATIVE PERCENT: 23 % (ref 20–42)
MCH RBC QN AUTO: 30.7 PG (ref 26–35)
MCHC RBC AUTO-ENTMCNC: 33.4 G/DL (ref 32–34.5)
MCV RBC AUTO: 91.7 FL (ref 80–99.9)
MONOCYTES NFR BLD: 0.75 K/UL (ref 0.1–0.95)
MONOCYTES NFR BLD: 13 % (ref 2–12)
NEUTROPHILS NFR BLD: 60 % (ref 43–80)
NEUTS SEG NFR BLD: 3.58 K/UL (ref 1.8–7.3)
PARTIAL THROMBOPLASTIN TIME: 40.3 SEC (ref 24.5–35.1)
PARTIAL THROMBOPLASTIN TIME: 45.8 SEC (ref 24.5–35.1)
PARTIAL THROMBOPLASTIN TIME: 47 SEC (ref 24.5–35.1)
PLATELET # BLD AUTO: 224 K/UL (ref 130–450)
PMV BLD AUTO: 10.6 FL (ref 7–12)
POTASSIUM SERPL-SCNC: 3.9 MMOL/L (ref 3.5–5)
PROT SERPL-MCNC: 6.3 G/DL (ref 6.4–8.3)
RBC # BLD AUTO: 4.24 M/UL (ref 3.8–5.8)
SODIUM SERPL-SCNC: 140 MMOL/L (ref 132–146)
TRIGL SERPL-MCNC: 199 MG/DL
TROPONIN I SERPL HS-MCNC: 16 NG/L (ref 0–11)
VLDLC SERPL CALC-MCNC: 40 MG/DL
WBC OTHER # BLD: 6 K/UL (ref 4.5–11.5)

## 2023-10-15 PROCEDURE — 96376 TX/PRO/DX INJ SAME DRUG ADON: CPT

## 2023-10-15 PROCEDURE — 85730 THROMBOPLASTIN TIME PARTIAL: CPT

## 2023-10-15 PROCEDURE — 3430000000 HC RX DIAGNOSTIC RADIOPHARMACEUTICAL: Performed by: RADIOLOGY

## 2023-10-15 PROCEDURE — 85379 FIBRIN DEGRADATION QUANT: CPT

## 2023-10-15 PROCEDURE — 6370000000 HC RX 637 (ALT 250 FOR IP): Performed by: NURSE PRACTITIONER

## 2023-10-15 PROCEDURE — 93017 CV STRESS TEST TRACING ONLY: CPT

## 2023-10-15 PROCEDURE — 2580000003 HC RX 258: Performed by: NURSE PRACTITIONER

## 2023-10-15 PROCEDURE — APPSS180 APP SPLIT SHARED TIME > 60 MINUTES: Performed by: CLINICAL NURSE SPECIALIST

## 2023-10-15 PROCEDURE — 85025 COMPLETE CBC W/AUTO DIFF WBC: CPT

## 2023-10-15 PROCEDURE — G0378 HOSPITAL OBSERVATION PER HR: HCPCS

## 2023-10-15 PROCEDURE — 93018 CV STRESS TEST I&R ONLY: CPT | Performed by: INTERNAL MEDICINE

## 2023-10-15 PROCEDURE — 80053 COMPREHEN METABOLIC PANEL: CPT

## 2023-10-15 PROCEDURE — 78452 HT MUSCLE IMAGE SPECT MULT: CPT

## 2023-10-15 PROCEDURE — 6370000000 HC RX 637 (ALT 250 FOR IP): Performed by: CLINICAL NURSE SPECIALIST

## 2023-10-15 PROCEDURE — 84484 ASSAY OF TROPONIN QUANT: CPT

## 2023-10-15 PROCEDURE — 96368 THER/DIAG CONCURRENT INF: CPT

## 2023-10-15 PROCEDURE — 78452 HT MUSCLE IMAGE SPECT MULT: CPT | Performed by: INTERNAL MEDICINE

## 2023-10-15 PROCEDURE — 93010 ELECTROCARDIOGRAM REPORT: CPT | Performed by: INTERNAL MEDICINE

## 2023-10-15 PROCEDURE — 96366 THER/PROPH/DIAG IV INF ADDON: CPT

## 2023-10-15 PROCEDURE — 36415 COLL VENOUS BLD VENIPUNCTURE: CPT

## 2023-10-15 PROCEDURE — 80061 LIPID PANEL: CPT

## 2023-10-15 PROCEDURE — 6360000002 HC RX W HCPCS: Performed by: EMERGENCY MEDICINE

## 2023-10-15 PROCEDURE — 93016 CV STRESS TEST SUPVJ ONLY: CPT | Performed by: INTERNAL MEDICINE

## 2023-10-15 PROCEDURE — A9500 TC99M SESTAMIBI: HCPCS | Performed by: RADIOLOGY

## 2023-10-15 RX ORDER — ASPIRIN 81 MG/1
81 TABLET, CHEWABLE ORAL DAILY
Status: DISCONTINUED | OUTPATIENT
Start: 2023-10-15 | End: 2023-10-15 | Stop reason: HOSPADM

## 2023-10-15 RX ORDER — IBUPROFEN 400 MG/1
400 TABLET ORAL ONCE
Status: DISCONTINUED | OUTPATIENT
Start: 2023-10-15 | End: 2023-10-15 | Stop reason: HOSPADM

## 2023-10-15 RX ORDER — TETRAKIS(2-METHOXYISOBUTYLISOCYANIDE)COPPER(I) TETRAFLUOROBORATE 1 MG/ML
30 INJECTION, POWDER, LYOPHILIZED, FOR SOLUTION INTRAVENOUS
Status: COMPLETED | OUTPATIENT
Start: 2023-10-15 | End: 2023-10-15

## 2023-10-15 RX ORDER — TETRAKIS(2-METHOXYISOBUTYLISOCYANIDE)COPPER(I) TETRAFLUOROBORATE 1 MG/ML
12 INJECTION, POWDER, LYOPHILIZED, FOR SOLUTION INTRAVENOUS
Status: COMPLETED | OUTPATIENT
Start: 2023-10-15 | End: 2023-10-15

## 2023-10-15 RX ADMIN — ATORVASTATIN CALCIUM 40 MG: 40 TABLET, FILM COATED ORAL at 09:57

## 2023-10-15 RX ADMIN — HEPARIN SODIUM 2000 UNITS: 1000 INJECTION INTRAVENOUS; SUBCUTANEOUS at 02:26

## 2023-10-15 RX ADMIN — FENOFIBRATE 135 MG: 54 TABLET ORAL at 09:59

## 2023-10-15 RX ADMIN — ASPIRIN 81 MG: 81 TABLET, CHEWABLE ORAL at 09:57

## 2023-10-15 RX ADMIN — Medication 30 MILLICURIE: at 13:14

## 2023-10-15 RX ADMIN — VALSARTAN 160 MG: 160 TABLET, FILM COATED ORAL at 09:58

## 2023-10-15 RX ADMIN — ACETAMINOPHEN 650 MG: 325 TABLET ORAL at 08:23

## 2023-10-15 RX ADMIN — Medication 10 ML: at 10:01

## 2023-10-15 RX ADMIN — Medication 12 MILLICURIE: at 11:29

## 2023-10-15 ASSESSMENT — PAIN DESCRIPTION - DESCRIPTORS: DESCRIPTORS: ACHING;POUNDING

## 2023-10-15 ASSESSMENT — PAIN DESCRIPTION - LOCATION: LOCATION: HEAD

## 2023-10-15 ASSESSMENT — PAIN SCALES - GENERAL: PAINLEVEL_OUTOF10: 3

## 2023-10-15 NOTE — CARE COORDINATION
10/15/2023discharge order noted. Sw spoke with floor nursing, no needs assessed at this time. RN to call if discharge needs should arise.   Electronically signed by TEMO Mclaughlin on 10/15/2023 at 2:50 PM

## 2023-10-15 NOTE — H&P
Internal Medicine History & Physical     Name: Abdoul Damico  : 1965  Chief Complaint: No chief complaint on file. Primary Care Physician: Xiang Brunner MD  Admission date: 10/14/2023  Date of service: 10/15/2023     History of Present Illness  James Allen is a 59y.o. year old male who presented with a chief complaint of chest pain. Presented to the ED at [facility] for chest pain radiating to both arms with associated shortness of breath and nausea, beginning 17 hours prior to arrival. No prior cardiac history noted. HS Trop 17 and 16. EKG reviewed by cardiology and ED providers for questionable abnormality I was told cardiology reviewed and advised there was no concern for STEMI. He was taken for cardiac stress testing today I did not see the patient as he was down for stress testing. Discussed with nursing staff please call me with any and all issues thank you. No past medical history on file. No past surgical history on file. No family history on file. Social History  Patient lives at home . At baseline patient ambulates with out assistance   Illicit drugs: Denies   TOBACCO:   reports that he has never smoked. He has never used smokeless tobacco.  ETOH:   has no history on file for alcohol use. Home Medications  Prior to Admission medications    Medication Sig Start Date End Date Taking?  Authorizing Provider   aspirin 81 MG chewable tablet Take 1 tablet by mouth daily   Yes Cammy Mcmanus MD   Icosapent Ethyl (VASCEPA) 1 g CAPS capsule Take 2 capsules by mouth 2 times daily    Cammy Mcmanus MD   atorvastatin (LIPITOR) 20 MG tablet Take 1 tablet by mouth daily    Cammy Mcmanus MD   valsartan (DIOVAN) 160 MG tablet Take 1 tablet by mouth daily    Cammy Mcmanus MD   fenofibrate (TRIGLIDE) 160 MG tablet Take 135 mg by mouth daily    Cammy Mcmanus MD   dapagliflozin (FARXIGA) 10 MG tablet Take 1 tablet by mouth every morning    Cammy Mcmanus

## 2023-10-15 NOTE — PROGRESS NOTES
Patient returned from stress test and is asking to leave. Cardiology messaged to confirm he can leave from their standpoint.

## 2023-10-15 NOTE — CONSULTS
Inpatient Cardiology Consultation      Reason for Consult:  Chest pain     Consulting Physician: Dr. Lavonne Guerra     Requesting Physician:  Dr. Lexy Joseph     Date of Consultation: 10/15/2023    History of Present Illness:   Mr. Dior De Anda is a 59year old gentleman who is previously unknown to our practice. He presented to the emergency room last night with chest discomfort  He is typically active, with a functional capacity of more than 4 METS and denies exertional chest discomfort. Last year, he had some intermittent dyspnea and had stress test and echocardiogram which were \"normal\" although he did not actually see a cardiologist. He was ultimately diagnosed with elevated right hemidiaphragm. He flew to [place] last week and while there walked around [employer] without any difficulty. Friday night, he went to bed around 11:00 and about thirty minutes later he started to have pressure across his chest and into both shoulders. He did not feel short of breath, but the pain was made worse with inspiration and somewhat improved when he sat up. He was able to sleep a few hours before flying home Saturday morning and while the pain continued, he was able to carry luggage through the airport,  his granddaughter, and lift his carry on onto the overhead compartment in the plane without worsening of his pain. Once he returned home, the pain became more severe again when he laid down. On arrival to the ER, he was hypertensive (167/78), pulse 101, and SpO2 92%. Initial EKG showed inferior ST elevation and he was evaluated by Dr. Lavonne Guerra. Serial EKGs were obtained which showed improvement in the elevation and it was felt the initial changes were there result of lead misplacement. He was started on intravenous Heparin and Nitroglycerin; BUN and Cr were 24 and 1.5 respectively, and serial hs-Ewa levels 17>>17>>16. Cr today has improved to 1.2. At this time, he is pain free but has not attempted to lie flat.  He reports

## 2023-10-15 NOTE — ED NOTES
Pts o2 was dropping to 89 put pt on 2 liters of 02 via nc pt states that is normal for him      Ana Matos, DANIEL  10/14/23 3471

## 2023-10-15 NOTE — PROCEDURES
Following placement of an intravenous catheter 10 millicuries of technetium 99m sestamibi was administered with resting SPECT images obtained approximately 45 minutes post injection. After completion of resting images, the patient exercised for 6:00 minutes on an accelerated Miky protocol treadmill achieving 10.1 METs of activity and 86% MPHR. Baseline tracing demonstrates sinus rhythm with a right bundle branch block. No anginal chest pain or cardiac arrhythmias were noted. A normal blood pressure response to exercise was recorded. No electrocardiographic changes were noted. One mintue prior to the completion of exercise 30 millicuries of technetium 99m sestamibi was injected with post stress SPECT images obtained approximately 30 minutes post stress. Perfusion images pending.

## 2023-10-15 NOTE — CARE COORDINATION
Internal Medicine On-call Care Coordination Note    I was called by the ED physician because they recommended admission for this patient and we cover their PCP. The history as I understand it after discussion with the ED physician is as follows:    Presents with chest pain radiating to arms, shortness of breath, nausea  EKG concerning for STEMI  EKG was repeated several times and patient was evaluated by internationalist- decision to defer cardiac cath  Patient placed on heparin gtt    I placed admission orders. Including:    Cardiology consult  Lipid panel  NPO midnight    Dr. Vel Casiano, or our coverage will see the patient tomorrow for H&P.     Electronically signed by JOHN Meyer CNP on 10/14/2023 at 9:09 PM

## 2023-10-16 NOTE — DISCHARGE SUMMARY
The patient was discharged on the same day as history and physical.  Please see history and physical as well as imaging studies, consult and evaluations, and nurse's notes.     Electronically signed by Polly Gomez MD on 10/15/2023 at 9:34 PM

## 2023-10-17 LAB
EKG ATRIAL RATE: 96 BPM
EKG P AXIS: 35 DEGREES
EKG P-R INTERVAL: 240 MS
EKG Q-T INTERVAL: 372 MS
EKG QRS DURATION: 154 MS
EKG QTC CALCULATION (BAZETT): 469 MS
EKG R AXIS: 5 DEGREES
EKG T AXIS: 20 DEGREES
EKG VENTRICULAR RATE: 96 BPM

## 2023-10-17 PROCEDURE — 93010 ELECTROCARDIOGRAM REPORT: CPT | Performed by: INTERNAL MEDICINE

## 2024-06-25 ENCOUNTER — APPOINTMENT (OUTPATIENT)
Dept: GENERAL RADIOLOGY | Age: 59
DRG: 871 | End: 2024-06-25
Payer: COMMERCIAL

## 2024-06-25 ENCOUNTER — APPOINTMENT (OUTPATIENT)
Dept: CT IMAGING | Age: 59
DRG: 871 | End: 2024-06-25
Payer: COMMERCIAL

## 2024-06-25 ENCOUNTER — APPOINTMENT (OUTPATIENT)
Dept: NUCLEAR MEDICINE | Age: 59
DRG: 871 | End: 2024-06-25
Payer: COMMERCIAL

## 2024-06-25 ENCOUNTER — HOSPITAL ENCOUNTER (INPATIENT)
Age: 59
LOS: 9 days | Discharge: HOME | DRG: 871 | End: 2024-07-04
Attending: EMERGENCY MEDICINE | Admitting: STUDENT IN AN ORGANIZED HEALTH CARE EDUCATION/TRAINING PROGRAM
Payer: COMMERCIAL

## 2024-06-25 DIAGNOSIS — R60.0 BILATERAL LOWER EXTREMITY EDEMA: ICD-10-CM

## 2024-06-25 DIAGNOSIS — R06.03 RESPIRATORY DISTRESS: ICD-10-CM

## 2024-06-25 DIAGNOSIS — J96.01 ACUTE RESPIRATORY FAILURE WITH HYPOXIA (HCC): ICD-10-CM

## 2024-06-25 DIAGNOSIS — G47.00 INSOMNIA, UNSPECIFIED TYPE: ICD-10-CM

## 2024-06-25 DIAGNOSIS — N17.9 ACUTE KIDNEY INJURY SUPERIMPOSED ON CKD (HCC): Primary | ICD-10-CM

## 2024-06-25 DIAGNOSIS — R09.02 HYPOXIA: ICD-10-CM

## 2024-06-25 DIAGNOSIS — N18.9 ACUTE KIDNEY INJURY SUPERIMPOSED ON CKD (HCC): Primary | ICD-10-CM

## 2024-06-25 DIAGNOSIS — I50.9 CONGESTIVE HEART FAILURE, UNSPECIFIED HF CHRONICITY, UNSPECIFIED HEART FAILURE TYPE (HCC): ICD-10-CM

## 2024-06-25 DIAGNOSIS — R53.81 MALAISE: ICD-10-CM

## 2024-06-25 DIAGNOSIS — R06.02 SHORTNESS OF BREATH: ICD-10-CM

## 2024-06-25 DIAGNOSIS — J18.9 PNEUMONIA OF RIGHT LOWER LOBE DUE TO INFECTIOUS ORGANISM: ICD-10-CM

## 2024-06-25 LAB
ALBUMIN SERPL-MCNC: 2.9 G/DL (ref 3.5–5.2)
ALP SERPL-CCNC: 74 U/L (ref 40–129)
ALT SERPL-CCNC: 133 U/L (ref 0–40)
AMMONIA PLAS-SCNC: 19 UMOL/L (ref 16–60)
AMPHET UR QL SCN: NEGATIVE
ANION GAP SERPL CALCULATED.3IONS-SCNC: 9 MMOL/L (ref 7–16)
APAP SERPL-MCNC: <5 UG/ML (ref 10–30)
AST SERPL-CCNC: 148 U/L (ref 0–39)
B PARAP IS1001 DNA NPH QL NAA+NON-PROBE: NOT DETECTED
B PERT DNA SPEC QL NAA+PROBE: NOT DETECTED
BACTERIA URNS QL MICRO: ABNORMAL
BARBITURATES UR QL SCN: NEGATIVE
BASOPHILS # BLD: 0 K/UL (ref 0–0.2)
BASOPHILS NFR BLD: 0 % (ref 0–2)
BENZODIAZ UR QL: NEGATIVE
BILIRUB SERPL-MCNC: 0.5 MG/DL (ref 0–1.2)
BILIRUB UR QL STRIP: NEGATIVE
BLASTS ABSOLUTE COUNT: 0.11 K/UL
BLASTS: 1 %
BNP SERPL-MCNC: 80 PG/ML (ref 0–125)
BUN SERPL-MCNC: 37 MG/DL (ref 6–23)
BUPRENORPHINE UR QL: NEGATIVE
C PNEUM DNA NPH QL NAA+NON-PROBE: NOT DETECTED
CALCIUM SERPL-MCNC: 8 MG/DL (ref 8.6–10.2)
CANNABINOIDS UR QL SCN: NEGATIVE
CHLORIDE SERPL-SCNC: 100 MMOL/L (ref 98–107)
CK SERPL-CCNC: 210 U/L (ref 20–200)
CLARITY UR: CLEAR
CO2 SERPL-SCNC: 22 MMOL/L (ref 22–29)
COCAINE UR QL SCN: NEGATIVE
COLOR UR: YELLOW
CREAT SERPL-MCNC: 2 MG/DL (ref 0.7–1.2)
CREAT UR-MCNC: 241.1 MG/DL (ref 40–278)
EKG ATRIAL RATE: 107 BPM
EKG P AXIS: 17 DEGREES
EKG P-R INTERVAL: 192 MS
EKG Q-T INTERVAL: 362 MS
EKG QRS DURATION: 150 MS
EKG QTC CALCULATION (BAZETT): 483 MS
EKG R AXIS: 44 DEGREES
EKG T AXIS: 5 DEGREES
EKG VENTRICULAR RATE: 107 BPM
EOSINOPHIL # BLD: 0.22 K/UL (ref 0.05–0.5)
EOSINOPHILS RELATIVE PERCENT: 2 % (ref 0–6)
ERYTHROCYTE [DISTWIDTH] IN BLOOD BY AUTOMATED COUNT: 15.5 % (ref 11.5–15)
ETHANOLAMINE SERPL-MCNC: <10 MG/DL (ref 0–0.08)
FENTANYL UR QL: NEGATIVE
FLUAV RNA NPH QL NAA+NON-PROBE: NOT DETECTED
FLUBV RNA NPH QL NAA+NON-PROBE: NOT DETECTED
GFR, ESTIMATED: 37 ML/MIN/1.73M2
GLUCOSE SERPL-MCNC: 147 MG/DL (ref 74–99)
GLUCOSE UR STRIP-MCNC: 100 MG/DL
HADV DNA NPH QL NAA+NON-PROBE: NOT DETECTED
HCOV 229E RNA NPH QL NAA+NON-PROBE: NOT DETECTED
HCOV HKU1 RNA NPH QL NAA+NON-PROBE: NOT DETECTED
HCOV NL63 RNA NPH QL NAA+NON-PROBE: NOT DETECTED
HCOV OC43 RNA NPH QL NAA+NON-PROBE: NOT DETECTED
HCT VFR BLD AUTO: 38.4 % (ref 37–54)
HGB BLD-MCNC: 12.4 G/DL (ref 12.5–16.5)
HGB UR QL STRIP.AUTO: NEGATIVE
HMPV RNA NPH QL NAA+NON-PROBE: NOT DETECTED
HPIV1 RNA NPH QL NAA+NON-PROBE: NOT DETECTED
HPIV2 RNA NPH QL NAA+NON-PROBE: NOT DETECTED
HPIV3 RNA NPH QL NAA+NON-PROBE: NOT DETECTED
HPIV4 RNA NPH QL NAA+NON-PROBE: NOT DETECTED
KETONES UR STRIP-MCNC: NEGATIVE MG/DL
LACTATE BLDV-SCNC: 1.5 MMOL/L (ref 0.5–2.2)
LEUKOCYTE ESTERASE UR QL STRIP: NEGATIVE
LIPASE SERPL-CCNC: 21 U/L (ref 13–60)
LYMPHOCYTES NFR BLD: 1.53 K/UL (ref 1.5–4)
LYMPHOCYTES RELATIVE PERCENT: 12 % (ref 20–42)
M PNEUMO DNA NPH QL NAA+NON-PROBE: NOT DETECTED
MAGNESIUM SERPL-MCNC: 2.1 MG/DL (ref 1.6–2.6)
MCH RBC QN AUTO: 29.9 PG (ref 26–35)
MCHC RBC AUTO-ENTMCNC: 32.3 G/DL (ref 32–34.5)
MCV RBC AUTO: 92.5 FL (ref 80–99.9)
METAMYELOCYTES ABSOLUTE COUNT: 0.22 K/UL (ref 0–0.12)
METAMYELOCYTES: 2 % (ref 0–1)
METHADONE UR QL: NEGATIVE
MONOCYTES NFR BLD: 1.42 K/UL (ref 0.1–0.95)
MONOCYTES NFR BLD: 11 % (ref 2–12)
NEUTROPHILS NFR BLD: 72 % (ref 43–80)
NEUTS SEG NFR BLD: 9.09 K/UL (ref 1.8–7.3)
NITRITE UR QL STRIP: NEGATIVE
OPIATES UR QL SCN: NEGATIVE
OXYCODONE UR QL SCN: NEGATIVE
PCP UR QL SCN: NEGATIVE
PH UR STRIP: 6 [PH] (ref 5–9)
PLATELET # BLD AUTO: 223 K/UL (ref 130–450)
PMV BLD AUTO: 10.1 FL (ref 7–12)
POTASSIUM SERPL-SCNC: 4.8 MMOL/L (ref 3.5–5)
PROT SERPL-MCNC: 5.5 G/DL (ref 6.4–8.3)
PROT UR STRIP-MCNC: ABNORMAL MG/DL
RBC # BLD AUTO: 4.15 M/UL (ref 3.8–5.8)
RBC # BLD: ABNORMAL 10*6/UL
RBC #/AREA URNS HPF: ABNORMAL /HPF
RSV RNA NPH QL NAA+NON-PROBE: NOT DETECTED
RV+EV RNA NPH QL NAA+NON-PROBE: NOT DETECTED
SARS-COV-2 RNA NPH QL NAA+NON-PROBE: NOT DETECTED
SODIUM SERPL-SCNC: 131 MMOL/L (ref 132–146)
SODIUM UR-SCNC: <20 MMOL/L
SP GR UR STRIP: 1.02 (ref 1–1.03)
SPECIMEN DESCRIPTION: NORMAL
T4 FREE SERPL-MCNC: 1 NG/DL (ref 0.9–1.7)
TEST INFORMATION: NORMAL
TROPONIN I SERPL HS-MCNC: 30 NG/L (ref 0–11)
TROPONIN I SERPL HS-MCNC: 35 NG/L (ref 0–11)
TSH SERPL DL<=0.05 MIU/L-ACNC: 1.86 UIU/ML (ref 0.27–4.2)
UROBILINOGEN UR STRIP-ACNC: 0.2 EU/DL (ref 0–1)
UUN UR-MCNC: 836 MG/DL (ref 800–1666)
WBC #/AREA URNS HPF: ABNORMAL /HPF
WBC OTHER # BLD: 12.6 K/UL (ref 4.5–11.5)

## 2024-06-25 PROCEDURE — 80143 DRUG ASSAY ACETAMINOPHEN: CPT

## 2024-06-25 PROCEDURE — 80307 DRUG TEST PRSMV CHEM ANLYZR: CPT

## 2024-06-25 PROCEDURE — A9540 TC99M MAA: HCPCS | Performed by: RADIOLOGY

## 2024-06-25 PROCEDURE — 83735 ASSAY OF MAGNESIUM: CPT

## 2024-06-25 PROCEDURE — 2580000003 HC RX 258

## 2024-06-25 PROCEDURE — G0480 DRUG TEST DEF 1-7 CLASSES: HCPCS

## 2024-06-25 PROCEDURE — 78582 LUNG VENTILAT&PERFUS IMAGING: CPT

## 2024-06-25 PROCEDURE — 83690 ASSAY OF LIPASE: CPT

## 2024-06-25 PROCEDURE — 84484 ASSAY OF TROPONIN QUANT: CPT

## 2024-06-25 PROCEDURE — 93010 ELECTROCARDIOGRAM REPORT: CPT | Performed by: INTERNAL MEDICINE

## 2024-06-25 PROCEDURE — 6360000002 HC RX W HCPCS: Performed by: STUDENT IN AN ORGANIZED HEALTH CARE EDUCATION/TRAINING PROGRAM

## 2024-06-25 PROCEDURE — 3430000000 HC RX DIAGNOSTIC RADIOPHARMACEUTICAL: Performed by: RADIOLOGY

## 2024-06-25 PROCEDURE — 81001 URINALYSIS AUTO W/SCOPE: CPT

## 2024-06-25 PROCEDURE — 84300 ASSAY OF URINE SODIUM: CPT

## 2024-06-25 PROCEDURE — 80053 COMPREHEN METABOLIC PANEL: CPT

## 2024-06-25 PROCEDURE — 2580000003 HC RX 258: Performed by: STUDENT IN AN ORGANIZED HEALTH CARE EDUCATION/TRAINING PROGRAM

## 2024-06-25 PROCEDURE — 82140 ASSAY OF AMMONIA: CPT

## 2024-06-25 PROCEDURE — 83605 ASSAY OF LACTIC ACID: CPT

## 2024-06-25 PROCEDURE — 84450 TRANSFERASE (AST) (SGOT): CPT

## 2024-06-25 PROCEDURE — 6360000002 HC RX W HCPCS: Performed by: NURSE PRACTITIONER

## 2024-06-25 PROCEDURE — 84460 ALANINE AMINO (ALT) (SGPT): CPT

## 2024-06-25 PROCEDURE — 71045 X-RAY EXAM CHEST 1 VIEW: CPT

## 2024-06-25 PROCEDURE — 83880 ASSAY OF NATRIURETIC PEPTIDE: CPT

## 2024-06-25 PROCEDURE — A9539 TC99M PENTETATE: HCPCS | Performed by: RADIOLOGY

## 2024-06-25 PROCEDURE — 82550 ASSAY OF CK (CPK): CPT

## 2024-06-25 PROCEDURE — 84520 ASSAY OF UREA NITROGEN: CPT

## 2024-06-25 PROCEDURE — 93005 ELECTROCARDIOGRAM TRACING: CPT

## 2024-06-25 PROCEDURE — 80074 ACUTE HEPATITIS PANEL: CPT

## 2024-06-25 PROCEDURE — 6360000002 HC RX W HCPCS

## 2024-06-25 PROCEDURE — 85025 COMPLETE CBC W/AUTO DIFF WBC: CPT

## 2024-06-25 PROCEDURE — 82977 ASSAY OF GGT: CPT

## 2024-06-25 PROCEDURE — 84540 ASSAY OF URINE/UREA-N: CPT

## 2024-06-25 PROCEDURE — 74176 CT ABD & PELVIS W/O CONTRAST: CPT

## 2024-06-25 PROCEDURE — 84439 ASSAY OF FREE THYROXINE: CPT

## 2024-06-25 PROCEDURE — 6370000000 HC RX 637 (ALT 250 FOR IP): Performed by: STUDENT IN AN ORGANIZED HEALTH CARE EDUCATION/TRAINING PROGRAM

## 2024-06-25 PROCEDURE — 82570 ASSAY OF URINE CREATININE: CPT

## 2024-06-25 PROCEDURE — 84443 ASSAY THYROID STIM HORMONE: CPT

## 2024-06-25 PROCEDURE — 0202U NFCT DS 22 TRGT SARS-COV-2: CPT

## 2024-06-25 PROCEDURE — 71250 CT THORAX DX C-: CPT

## 2024-06-25 PROCEDURE — 6370000000 HC RX 637 (ALT 250 FOR IP): Performed by: NURSE PRACTITIONER

## 2024-06-25 PROCEDURE — 2060000000 HC ICU INTERMEDIATE R&B

## 2024-06-25 PROCEDURE — 2500000003 HC RX 250 WO HCPCS

## 2024-06-25 PROCEDURE — 83883 ASSAY NEPHELOMETRY NOT SPEC: CPT

## 2024-06-25 PROCEDURE — 99285 EMERGENCY DEPT VISIT HI MDM: CPT

## 2024-06-25 RX ORDER — ASPIRIN 81 MG/1
81 TABLET, CHEWABLE ORAL DAILY
Status: DISCONTINUED | OUTPATIENT
Start: 2024-06-25 | End: 2024-07-04 | Stop reason: HOSPADM

## 2024-06-25 RX ORDER — SODIUM CHLORIDE 0.9 % (FLUSH) 0.9 %
10 SYRINGE (ML) INJECTION PRN
Status: DISCONTINUED | OUTPATIENT
Start: 2024-06-25 | End: 2024-07-04 | Stop reason: HOSPADM

## 2024-06-25 RX ORDER — ACETAMINOPHEN 650 MG/1
650 SUPPOSITORY RECTAL EVERY 6 HOURS PRN
Status: DISCONTINUED | OUTPATIENT
Start: 2024-06-25 | End: 2024-07-04 | Stop reason: HOSPADM

## 2024-06-25 RX ORDER — CALCIUM POLYCARBOPHIL 625 MG 625 MG/1
1350 TABLET ORAL DAILY
COMMUNITY

## 2024-06-25 RX ORDER — ICOSAPENT ETHYL 1 G/1
2 CAPSULE ORAL DAILY
COMMUNITY

## 2024-06-25 RX ORDER — 0.9 % SODIUM CHLORIDE 0.9 %
1000 INTRAVENOUS SOLUTION INTRAVENOUS ONCE
Status: COMPLETED | OUTPATIENT
Start: 2024-06-25 | End: 2024-06-25

## 2024-06-25 RX ORDER — SODIUM CHLORIDE 0.9 % (FLUSH) 0.9 %
10 SYRINGE (ML) INJECTION EVERY 12 HOURS SCHEDULED
Status: DISCONTINUED | OUTPATIENT
Start: 2024-06-25 | End: 2024-07-04 | Stop reason: HOSPADM

## 2024-06-25 RX ORDER — DEXTROSE MONOHYDRATE 100 MG/ML
INJECTION, SOLUTION INTRAVENOUS CONTINUOUS PRN
Status: DISCONTINUED | OUTPATIENT
Start: 2024-06-25 | End: 2024-07-04 | Stop reason: HOSPADM

## 2024-06-25 RX ORDER — KIT FOR THE PREPARATION OF TECHNETIUM TC 99M PENTETATE 20 MG/1
35 INJECTION, POWDER, LYOPHILIZED, FOR SOLUTION INTRAVENOUS; RESPIRATORY (INHALATION)
Status: COMPLETED | OUTPATIENT
Start: 2024-06-25 | End: 2024-06-25

## 2024-06-25 RX ORDER — ACETAMINOPHEN 325 MG/1
650 TABLET ORAL EVERY 6 HOURS PRN
Status: DISCONTINUED | OUTPATIENT
Start: 2024-06-25 | End: 2024-07-04 | Stop reason: HOSPADM

## 2024-06-25 RX ORDER — LANOLIN ALCOHOL/MO/W.PET/CERES
6 CREAM (GRAM) TOPICAL NIGHTLY PRN
Status: DISCONTINUED | OUTPATIENT
Start: 2024-06-25 | End: 2024-07-04 | Stop reason: HOSPADM

## 2024-06-25 RX ORDER — ALBUTEROL SULFATE 2.5 MG/3ML
2.5 SOLUTION RESPIRATORY (INHALATION) 4 TIMES DAILY PRN
Status: DISCONTINUED | OUTPATIENT
Start: 2024-06-25 | End: 2024-07-04 | Stop reason: HOSPADM

## 2024-06-25 RX ORDER — HYDROXYZINE HYDROCHLORIDE 50 MG/ML
25 INJECTION, SOLUTION INTRAMUSCULAR NIGHTLY PRN
Status: DISCONTINUED | OUTPATIENT
Start: 2024-06-25 | End: 2024-07-04 | Stop reason: HOSPADM

## 2024-06-25 RX ORDER — INSULIN LISPRO 100 [IU]/ML
0-8 INJECTION, SOLUTION INTRAVENOUS; SUBCUTANEOUS
Status: DISCONTINUED | OUTPATIENT
Start: 2024-06-25 | End: 2024-07-04 | Stop reason: HOSPADM

## 2024-06-25 RX ORDER — POTASSIUM CHLORIDE 7.45 MG/ML
10 INJECTION INTRAVENOUS PRN
Status: DISCONTINUED | OUTPATIENT
Start: 2024-06-25 | End: 2024-07-04 | Stop reason: HOSPADM

## 2024-06-25 RX ORDER — INSULIN LISPRO 100 [IU]/ML
0-4 INJECTION, SOLUTION INTRAVENOUS; SUBCUTANEOUS NIGHTLY
Status: DISCONTINUED | OUTPATIENT
Start: 2024-06-25 | End: 2024-07-04 | Stop reason: HOSPADM

## 2024-06-25 RX ORDER — FINERENONE 20 MG/1
1 TABLET, FILM COATED ORAL DAILY
COMMUNITY

## 2024-06-25 RX ORDER — ENOXAPARIN SODIUM 100 MG/ML
30 INJECTION SUBCUTANEOUS 2 TIMES DAILY
Status: DISCONTINUED | OUTPATIENT
Start: 2024-06-25 | End: 2024-07-04 | Stop reason: HOSPADM

## 2024-06-25 RX ORDER — FENOFIBRIC ACID 135 MG/1
135 CAPSULE, DELAYED RELEASE ORAL DAILY
COMMUNITY

## 2024-06-25 RX ORDER — VALACYCLOVIR HYDROCHLORIDE 500 MG/1
500 TABLET, FILM COATED ORAL DAILY PRN
Status: ON HOLD | COMMUNITY
End: 2024-07-03 | Stop reason: HOSPADM

## 2024-06-25 RX ORDER — POTASSIUM CHLORIDE 20 MEQ/1
40 TABLET, EXTENDED RELEASE ORAL PRN
Status: DISCONTINUED | OUTPATIENT
Start: 2024-06-25 | End: 2024-07-04 | Stop reason: HOSPADM

## 2024-06-25 RX ORDER — ONDANSETRON 4 MG/1
4 TABLET, ORALLY DISINTEGRATING ORAL EVERY 8 HOURS PRN
Status: DISCONTINUED | OUTPATIENT
Start: 2024-06-25 | End: 2024-07-04 | Stop reason: HOSPADM

## 2024-06-25 RX ORDER — DULAGLUTIDE 3 MG/.5ML
3 INJECTION, SOLUTION SUBCUTANEOUS WEEKLY
COMMUNITY

## 2024-06-25 RX ORDER — INSULIN DEGLUDEC 200 U/ML
100 INJECTION, SOLUTION SUBCUTANEOUS DAILY
COMMUNITY

## 2024-06-25 RX ORDER — SENNOSIDES A AND B 8.6 MG/1
1 TABLET, FILM COATED ORAL DAILY PRN
Status: DISCONTINUED | OUTPATIENT
Start: 2024-06-25 | End: 2024-07-04 | Stop reason: HOSPADM

## 2024-06-25 RX ORDER — M-VIT,TX,IRON,MINS/CALC/FOLIC 27MG-0.4MG
1 TABLET ORAL DAILY
COMMUNITY

## 2024-06-25 RX ORDER — SILDENAFIL 50 MG/1
50 TABLET, FILM COATED ORAL DAILY PRN
COMMUNITY

## 2024-06-25 RX ORDER — GLUCAGON 1 MG/ML
1 KIT INJECTION PRN
Status: DISCONTINUED | OUTPATIENT
Start: 2024-06-25 | End: 2024-07-04 | Stop reason: HOSPADM

## 2024-06-25 RX ORDER — LANOLIN ALCOHOL/MO/W.PET/CERES
3 CREAM (GRAM) TOPICAL NIGHTLY PRN
Status: DISCONTINUED | OUTPATIENT
Start: 2024-06-25 | End: 2024-06-25

## 2024-06-25 RX ORDER — ONDANSETRON 2 MG/ML
4 INJECTION INTRAMUSCULAR; INTRAVENOUS EVERY 6 HOURS PRN
Status: DISCONTINUED | OUTPATIENT
Start: 2024-06-25 | End: 2024-07-04 | Stop reason: HOSPADM

## 2024-06-25 RX ORDER — SODIUM CHLORIDE 9 MG/ML
INJECTION, SOLUTION INTRAVENOUS PRN
Status: DISCONTINUED | OUTPATIENT
Start: 2024-06-25 | End: 2024-07-04 | Stop reason: HOSPADM

## 2024-06-25 RX ORDER — INSULIN LISPRO 100 [IU]/ML
0-25 INJECTION, SOLUTION INTRAVENOUS; SUBCUTANEOUS
COMMUNITY

## 2024-06-25 RX ADMIN — ENOXAPARIN SODIUM 30 MG: 100 INJECTION SUBCUTANEOUS at 20:14

## 2024-06-25 RX ADMIN — SODIUM CHLORIDE 1000 ML: 9 INJECTION, SOLUTION INTRAVENOUS at 12:49

## 2024-06-25 RX ADMIN — KIT FOR THE PREPARATION OF TECHNETIUM TC 99M PENTETATE 35 MILLICURIE: 20 INJECTION, POWDER, LYOPHILIZED, FOR SOLUTION INTRAVENOUS; RESPIRATORY (INHALATION) at 13:30

## 2024-06-25 RX ADMIN — ASPIRIN 81 MG 81 MG: 81 TABLET ORAL at 14:25

## 2024-06-25 RX ADMIN — HYDROXYZINE HYDROCHLORIDE 25 MG: 50 INJECTION, SOLUTION INTRAMUSCULAR at 21:03

## 2024-06-25 RX ADMIN — Medication 6 MG: at 21:03

## 2024-06-25 RX ADMIN — Medication 6 MILLICURIE: at 13:55

## 2024-06-25 RX ADMIN — SODIUM CHLORIDE, PRESERVATIVE FREE 10 ML: 5 INJECTION INTRAVENOUS at 20:15

## 2024-06-25 RX ADMIN — ONDANSETRON 4 MG: 2 INJECTION INTRAMUSCULAR; INTRAVENOUS at 18:46

## 2024-06-25 RX ADMIN — ENOXAPARIN SODIUM 30 MG: 100 INJECTION SUBCUTANEOUS at 14:25

## 2024-06-25 RX ADMIN — CEFTRIAXONE SODIUM 2000 MG: 2 INJECTION, POWDER, FOR SOLUTION INTRAMUSCULAR; INTRAVENOUS at 14:26

## 2024-06-25 RX ADMIN — SODIUM CHLORIDE 1000 ML: 9 INJECTION, SOLUTION INTRAVENOUS at 10:56

## 2024-06-25 RX ADMIN — DOXYCYCLINE 100 MG: 100 INJECTION, POWDER, LYOPHILIZED, FOR SOLUTION INTRAVENOUS at 14:34

## 2024-06-25 ASSESSMENT — PAIN - FUNCTIONAL ASSESSMENT: PAIN_FUNCTIONAL_ASSESSMENT: NONE - DENIES PAIN

## 2024-06-25 NOTE — CARE COORDINATION
Sent in by Dr Jacques for worsening renal function CKD baseline around 1.5-2.0, Worsening volume overload, elevated LFTs, hypoxia and shortness of breath, has a known RBBB

## 2024-06-25 NOTE — ED PROVIDER NOTES
Department of Emergency Medicine   ED Provider Note  Admit Date/RoomTime: 6/25/2024  9:09 AM  ED Room: 8506/8506-A          History of Present Illness:  6/25/24, Time: 9:28 AM EDT       David Moyer is a 58 y.o. male presenting to the ED for diffuse bodyaches, shortness of breath and generalized weakness.  He notes he has been weak and fatigued for the past 2 weeks.  Notes he feels unwell all over.  Reports he started ezetimibe about 2-3 weeks ago just prior to sx onset and then he stopped it about 10 days ago without improvement in his sx. He reports he has been having subjective fevers however no objective fevers. No chills, CP, cough or congestion. Pt reports he feels like he has been urinating less than normal lately. Leg swelling over the past couple days. No rashes, blurry or double vision, numbness or tingling.     Review of Systems:     Pertinent positives and negatives are stated within HPI.      --------------------------------------------- PAST HISTORY ---------------------------------------------  Past Medical History:  has no past medical history on file.    Past Surgical History:  has no past surgical history on file.    Social History:  reports that he has never smoked. He has never used smokeless tobacco.    Family History: family history is not on file.     The patient’s home medications have been reviewed.    Allergies: Patient has no known allergies.      ---------------------------------------------------PHYSICAL EXAM--------------------------------------    Physical Exam  Vitals and nursing note reviewed.   Constitutional:       General: He is not in acute distress.     Appearance: Normal appearance.   HENT:      Head: Normocephalic and atraumatic.      Mouth/Throat:      Mouth: Mucous membranes are moist.      Pharynx: Oropharynx is clear.   Eyes:      Extraocular Movements: Extraocular movements intact.      Conjunctiva/sclera: Conjunctivae normal.      Pupils: Pupils are equal, round, 
dictation for   intra-abdominal and intrapelvic findings.         XR CHEST PORTABLE   Final Result   1.  No significant change.  Low lung volumes, chronic elevation of the right   hemidiaphragm, and right basilar chronic atelectasis/scarring.      2.  No pneumonia or acute infiltrate identified.           XR CHEST PORTABLE    Result Date: 6/25/2024  EXAMINATION: ONE XRAY VIEW OF THE CHEST 6/25/2024 9:49 am COMPARISON: 10/14/2023 HISTORY: ORDERING SYSTEM PROVIDED HISTORY: SOB; concern for PNA TECHNOLOGIST PROVIDED HISTORY: Reason for exam:->SOB; concern for PNA FINDINGS: No significant change.  Limited single view chest with low lung volumes and large body habitus.  Chronic elevation of the right hemidiaphragm and with right basilar chronic atelectasis/scarring.  No pulmonary consolidation or acute pulmonary infiltrate.  Left midlung calcified granuloma, unchanged. The heart appears normal in size.  No vascular congestion or significant pleural effusion.     1.  No significant change.  Low lung volumes, chronic elevation of the right hemidiaphragm, and right basilar chronic atelectasis/scarring. 2.  No pneumonia or acute infiltrate identified.       No results found.         PAST MEDICAL HISTORY/Chronic Conditions Affecting Care      has no past medical history on file.     EMERGENCY DEPARTMENT COURSE    Vitals:    Vitals:    06/25/24 1702 06/25/24 1745 06/25/24 1805 06/25/24 2012   BP: 136/75 (!) 149/70  124/69   Pulse: 96 (!) 107  (!) 113   Resp: 21 20 16   Temp:  97.6 °F (36.4 °C)  98.3 °F (36.8 °C)   TempSrc:  Oral     SpO2: 94% 93%  93%   Weight:   108.9 kg (240 lb)    Height:   1.88 m (6' 2\")        Patient was given the following medications:  Medications   sodium chloride flush 0.9 % injection 10 mL (10 mLs IntraVENous Given 6/25/24 2015)   sodium chloride flush 0.9 % injection 10 mL (has no administration in time range)   0.9 % sodium chloride infusion (has no administration in time range)   potassium

## 2024-06-26 ENCOUNTER — APPOINTMENT (OUTPATIENT)
Dept: ULTRASOUND IMAGING | Age: 59
DRG: 871 | End: 2024-06-26
Payer: COMMERCIAL

## 2024-06-26 LAB
ALBUMIN SERPL-MCNC: 2.8 G/DL (ref 3.5–5.2)
ALP SERPL-CCNC: 80 U/L (ref 40–129)
ALT SERPL-CCNC: 117 U/L (ref 0–40)
ANION GAP SERPL CALCULATED.3IONS-SCNC: 10 MMOL/L (ref 7–16)
ANION GAP SERPL CALCULATED.3IONS-SCNC: 10 MMOL/L (ref 7–16)
AST SERPL-CCNC: 125 U/L (ref 0–39)
ATYPICAL LYMPHOCYTE ABSOLUTE COUNT: 3.02 K/UL (ref 0–0.46)
ATYPICAL LYMPHOCYTES: 26 % (ref 0–4)
B PARAP IS1001 DNA NPH QL NAA+NON-PROBE: NOT DETECTED
B PERT DNA SPEC QL NAA+PROBE: NOT DETECTED
BASOPHILS # BLD: 0 K/UL (ref 0–0.2)
BASOPHILS NFR BLD: 0 % (ref 0–2)
BILIRUB SERPL-MCNC: 0.5 MG/DL (ref 0–1.2)
BUN SERPL-MCNC: 35 MG/DL (ref 6–23)
BUN SERPL-MCNC: 37 MG/DL (ref 6–23)
C PNEUM DNA NPH QL NAA+NON-PROBE: NOT DETECTED
CA-I BLD-SCNC: 1.13 MMOL/L (ref 1.15–1.33)
CALCIUM SERPL-MCNC: 7.8 MG/DL (ref 8.6–10.2)
CALCIUM SERPL-MCNC: 8.2 MG/DL (ref 8.6–10.2)
CHLORIDE SERPL-SCNC: 102 MMOL/L (ref 98–107)
CHLORIDE SERPL-SCNC: 104 MMOL/L (ref 98–107)
CO2 SERPL-SCNC: 19 MMOL/L (ref 22–29)
CO2 SERPL-SCNC: 23 MMOL/L (ref 22–29)
CORTIS SERPL-MCNC: 20.5 UG/DL (ref 2.7–18.4)
CORTISOL COLLECTION INFO: ABNORMAL
CREAT SERPL-MCNC: 2 MG/DL (ref 0.7–1.2)
CREAT SERPL-MCNC: 2.1 MG/DL (ref 0.7–1.2)
CREAT UR-MCNC: 111.8 MG/DL (ref 40–278)
CREAT UR-MCNC: 113.5 MG/DL (ref 40–278)
CREAT UR-MCNC: 114.7 MG/DL (ref 40–278)
EOSINOPHIL # BLD: 0 K/UL (ref 0.05–0.5)
EOSINOPHILS RELATIVE PERCENT: 0 % (ref 0–6)
ERYTHROCYTE [DISTWIDTH] IN BLOOD BY AUTOMATED COUNT: 15.7 % (ref 11.5–15)
FLUAV RNA NPH QL NAA+NON-PROBE: NOT DETECTED
FLUBV RNA NPH QL NAA+NON-PROBE: NOT DETECTED
GFR, ESTIMATED: 36 ML/MIN/1.73M2
GFR, ESTIMATED: 37 ML/MIN/1.73M2
GLUCOSE SERPL-MCNC: 152 MG/DL (ref 74–99)
GLUCOSE SERPL-MCNC: 182 MG/DL (ref 74–99)
HADV DNA NPH QL NAA+NON-PROBE: NOT DETECTED
HAV IGM SERPL QL IA: NONREACTIVE
HBA1C MFR BLD: 6.5 % (ref 4–5.6)
HBV CORE IGM SERPL QL IA: NONREACTIVE
HBV SURFACE AG SERPL QL IA: NONREACTIVE
HCOV 229E RNA NPH QL NAA+NON-PROBE: NOT DETECTED
HCOV HKU1 RNA NPH QL NAA+NON-PROBE: NOT DETECTED
HCOV NL63 RNA NPH QL NAA+NON-PROBE: NOT DETECTED
HCOV OC43 RNA NPH QL NAA+NON-PROBE: NOT DETECTED
HCT VFR BLD AUTO: 39.5 % (ref 37–54)
HCV AB SERPL QL IA: NONREACTIVE
HGB BLD-MCNC: 13.1 G/DL (ref 12.5–16.5)
HMPV RNA NPH QL NAA+NON-PROBE: NOT DETECTED
HPIV1 RNA NPH QL NAA+NON-PROBE: NOT DETECTED
HPIV2 RNA NPH QL NAA+NON-PROBE: NOT DETECTED
HPIV3 RNA NPH QL NAA+NON-PROBE: NOT DETECTED
HPIV4 RNA NPH QL NAA+NON-PROBE: NOT DETECTED
LYMPHOCYTES NFR BLD: 3.25 K/UL (ref 1.5–4)
LYMPHOCYTES RELATIVE PERCENT: 28 % (ref 20–42)
M PNEUMO DNA NPH QL NAA+NON-PROBE: NOT DETECTED
MAGNESIUM SERPL-MCNC: 2.3 MG/DL (ref 1.6–2.6)
MCH RBC QN AUTO: 31.6 PG (ref 26–35)
MCHC RBC AUTO-ENTMCNC: 33.2 G/DL (ref 32–34.5)
MCV RBC AUTO: 95.4 FL (ref 80–99.9)
MONOCYTES NFR BLD: 1.62 K/UL (ref 0.1–0.95)
MONOCYTES NFR BLD: 14 % (ref 2–12)
NEUTROPHILS NFR BLD: 32 % (ref 43–80)
NEUTS SEG NFR BLD: 3.71 K/UL (ref 1.8–7.3)
OSMOLALITY SERPL: 301 MOSM/KG (ref 285–310)
PHOSPHATE SERPL-MCNC: 2.7 MG/DL (ref 2.5–4.5)
PLATELET # BLD AUTO: 211 K/UL (ref 130–450)
PMV BLD AUTO: 10.9 FL (ref 7–12)
POTASSIUM SERPL-SCNC: 4.8 MMOL/L (ref 3.5–5)
POTASSIUM SERPL-SCNC: 5.5 MMOL/L (ref 3.5–5)
PROCALCITONIN SERPL-MCNC: 0.59 NG/ML (ref 0–0.08)
PROT SERPL-MCNC: 5.5 G/DL (ref 6.4–8.3)
RBC # BLD AUTO: 4.14 M/UL (ref 3.8–5.8)
RBC # BLD: ABNORMAL 10*6/UL
RSV RNA NPH QL NAA+NON-PROBE: NOT DETECTED
RV+EV RNA NPH QL NAA+NON-PROBE: NOT DETECTED
SARS-COV-2 RNA NPH QL NAA+NON-PROBE: NOT DETECTED
SODIUM SERPL-SCNC: 133 MMOL/L (ref 132–146)
SODIUM SERPL-SCNC: 135 MMOL/L (ref 132–146)
SODIUM UR-SCNC: 69 MMOL/L
SPECIMEN DESCRIPTION: NORMAL
TOTAL PROTEIN, URINE: 13 MG/DL (ref 0–12)
URATE SERPL-MCNC: 6.9 MG/DL (ref 3.4–7)
URINE TOTAL PROTEIN CREATININE RATIO: 0.11 (ref 0–0.2)
UUN UR-MCNC: 454 MG/DL (ref 800–1666)
WBC # BLD: ABNORMAL 10*3/UL
WBC OTHER # BLD: 11.6 K/UL (ref 4.5–11.5)

## 2024-06-26 PROCEDURE — 84145 PROCALCITONIN (PCT): CPT

## 2024-06-26 PROCEDURE — 80053 COMPREHEN METABOLIC PANEL: CPT

## 2024-06-26 PROCEDURE — 82330 ASSAY OF CALCIUM: CPT

## 2024-06-26 PROCEDURE — 84166 PROTEIN E-PHORESIS/URINE/CSF: CPT

## 2024-06-26 PROCEDURE — 84540 ASSAY OF URINE/UREA-N: CPT

## 2024-06-26 PROCEDURE — 6360000002 HC RX W HCPCS: Performed by: STUDENT IN AN ORGANIZED HEALTH CARE EDUCATION/TRAINING PROGRAM

## 2024-06-26 PROCEDURE — 84100 ASSAY OF PHOSPHORUS: CPT

## 2024-06-26 PROCEDURE — 84550 ASSAY OF BLOOD/URIC ACID: CPT

## 2024-06-26 PROCEDURE — 6370000000 HC RX 637 (ALT 250 FOR IP): Performed by: STUDENT IN AN ORGANIZED HEALTH CARE EDUCATION/TRAINING PROGRAM

## 2024-06-26 PROCEDURE — 82570 ASSAY OF URINE CREATININE: CPT

## 2024-06-26 PROCEDURE — 83036 HEMOGLOBIN GLYCOSYLATED A1C: CPT

## 2024-06-26 PROCEDURE — 6360000002 HC RX W HCPCS

## 2024-06-26 PROCEDURE — 86335 IMMUNFIX E-PHORSIS/URINE/CSF: CPT

## 2024-06-26 PROCEDURE — 6370000000 HC RX 637 (ALT 250 FOR IP)

## 2024-06-26 PROCEDURE — 6360000002 HC RX W HCPCS: Performed by: INTERNAL MEDICINE

## 2024-06-26 PROCEDURE — 85025 COMPLETE CBC W/AUTO DIFF WBC: CPT

## 2024-06-26 PROCEDURE — 2580000003 HC RX 258: Performed by: STUDENT IN AN ORGANIZED HEALTH CARE EDUCATION/TRAINING PROGRAM

## 2024-06-26 PROCEDURE — 80048 BASIC METABOLIC PNL TOTAL CA: CPT

## 2024-06-26 PROCEDURE — 6370000000 HC RX 637 (ALT 250 FOR IP): Performed by: NURSE PRACTITIONER

## 2024-06-26 PROCEDURE — 87449 NOS EACH ORGANISM AG IA: CPT

## 2024-06-26 PROCEDURE — 83930 ASSAY OF BLOOD OSMOLALITY: CPT

## 2024-06-26 PROCEDURE — 87899 AGENT NOS ASSAY W/OPTIC: CPT

## 2024-06-26 PROCEDURE — 84300 ASSAY OF URINE SODIUM: CPT

## 2024-06-26 PROCEDURE — 2580000003 HC RX 258: Performed by: INTERNAL MEDICINE

## 2024-06-26 PROCEDURE — 83935 ASSAY OF URINE OSMOLALITY: CPT

## 2024-06-26 PROCEDURE — 82533 TOTAL CORTISOL: CPT

## 2024-06-26 PROCEDURE — 2060000000 HC ICU INTERMEDIATE R&B

## 2024-06-26 PROCEDURE — 83735 ASSAY OF MAGNESIUM: CPT

## 2024-06-26 PROCEDURE — 36415 COLL VENOUS BLD VENIPUNCTURE: CPT

## 2024-06-26 PROCEDURE — 76705 ECHO EXAM OF ABDOMEN: CPT

## 2024-06-26 PROCEDURE — 84156 ASSAY OF PROTEIN URINE: CPT

## 2024-06-26 PROCEDURE — 93970 EXTREMITY STUDY: CPT

## 2024-06-26 PROCEDURE — 0202U NFCT DS 22 TRGT SARS-COV-2: CPT

## 2024-06-26 PROCEDURE — 6370000000 HC RX 637 (ALT 250 FOR IP): Performed by: INTERNAL MEDICINE

## 2024-06-26 RX ORDER — ZOLPIDEM TARTRATE 5 MG/1
5 TABLET ORAL NIGHTLY PRN
Status: DISCONTINUED | OUTPATIENT
Start: 2024-06-26 | End: 2024-07-04 | Stop reason: HOSPADM

## 2024-06-26 RX ORDER — CALCIUM CARBONATE 500 MG/1
500 TABLET, CHEWABLE ORAL ONCE
Status: DISCONTINUED | OUTPATIENT
Start: 2024-06-26 | End: 2024-07-04 | Stop reason: HOSPADM

## 2024-06-26 RX ORDER — BUMETANIDE 0.25 MG/ML
1 INJECTION INTRAMUSCULAR; INTRAVENOUS 2 TIMES DAILY
Status: DISCONTINUED | OUTPATIENT
Start: 2024-06-26 | End: 2024-06-27

## 2024-06-26 RX ORDER — METRONIDAZOLE 500 MG/100ML
500 INJECTION, SOLUTION INTRAVENOUS EVERY 8 HOURS
Status: DISCONTINUED | OUTPATIENT
Start: 2024-06-26 | End: 2024-06-27

## 2024-06-26 RX ORDER — SODIUM BICARBONATE 650 MG/1
650 TABLET ORAL 2 TIMES DAILY
Status: DISCONTINUED | OUTPATIENT
Start: 2024-06-26 | End: 2024-06-30

## 2024-06-26 RX ADMIN — SODIUM CHLORIDE, PRESERVATIVE FREE 10 ML: 5 INJECTION INTRAVENOUS at 07:54

## 2024-06-26 RX ADMIN — ENOXAPARIN SODIUM 30 MG: 100 INJECTION SUBCUTANEOUS at 20:22

## 2024-06-26 RX ADMIN — METRONIDAZOLE 500 MG: 500 INJECTION, SOLUTION INTRAVENOUS at 15:15

## 2024-06-26 RX ADMIN — SODIUM BICARBONATE 650 MG: 650 TABLET ORAL at 20:22

## 2024-06-26 RX ADMIN — SODIUM CHLORIDE, PRESERVATIVE FREE 10 ML: 5 INJECTION INTRAVENOUS at 20:23

## 2024-06-26 RX ADMIN — SODIUM ZIRCONIUM CYCLOSILICATE 10 G: 10 POWDER, FOR SUSPENSION ORAL at 10:52

## 2024-06-26 RX ADMIN — ONDANSETRON 4 MG: 2 INJECTION INTRAMUSCULAR; INTRAVENOUS at 17:42

## 2024-06-26 RX ADMIN — ZOLPIDEM TARTRATE 5 MG: 5 TABLET ORAL at 20:22

## 2024-06-26 RX ADMIN — METRONIDAZOLE 500 MG: 500 INJECTION, SOLUTION INTRAVENOUS at 07:59

## 2024-06-26 RX ADMIN — Medication 6 MG: at 20:22

## 2024-06-26 RX ADMIN — BUMETANIDE 1 MG: 0.25 INJECTION INTRAMUSCULAR; INTRAVENOUS at 15:12

## 2024-06-26 RX ADMIN — CEFTRIAXONE SODIUM 2000 MG: 2 INJECTION, POWDER, FOR SOLUTION INTRAMUSCULAR; INTRAVENOUS at 15:12

## 2024-06-26 RX ADMIN — ASPIRIN 81 MG 81 MG: 81 TABLET ORAL at 07:55

## 2024-06-26 RX ADMIN — SODIUM BICARBONATE 650 MG: 650 TABLET ORAL at 11:50

## 2024-06-26 NOTE — CARE COORDINATION
Pt lives at home with wife and a grown daughter. 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 Pt is independent and drives. He is not a  and has no dme or hhc pta. Pt is up in the room independently. His pcp is dr. Vern jacques. Pt has a 2 story home with 2nd floor bed and bath and a 1/2 bath on the first floor. They use the garage to enter with a couple of steps. Pt is diabetic he said pta. On insulin with a working glucometer. Plan is home with no needs via wife or family. U/S of the gallbladder is pending. VQ scan was low probability. General surgery is to see. Pt is on iv rocephin and flagyl. TEMO Santana  6/26/2024    Case Management Assessment  Initial Evaluation    Date/Time of Evaluation: 6/26/2024 11:24 AM  Assessment Completed by: TEMO Santana    If patient is discharged prior to next notation, then this note serves as note for discharge by case management.    Patient Name: David Moyer                   YOB: 1965  Diagnosis: Malaise [R53.81]  Respiratory distress [R06.03]  Hypoxia [R09.02]  Acute respiratory failure with hypoxia (HCC) [J96.01]  Pneumonia of right lower lobe due to infectious organism [J18.9]                   Date / Time: 6/25/2024  9:09 AM    Patient Admission Status: Inpatient   Readmission Risk (Low < 19, Mod (19-27), High > 27): Readmission Risk Score: 11.4    Current PCP: Vern Jacques MD  PCP verified by ? Yes    Chart Reviewed: Yes      History Provided by: Patient  Patient Orientation: Alert and Oriented    Patient Cognition: Alert    Hospitalization in the last 30 days (Readmission):  No    If yes, Readmission Assessment in  Navigator will be completed.    Advance Directives:      Code Status: Full Code   Patient's Primary Decision Maker is: Legal Next of Kin (need copy)      Discharge Planning:    Patient lives with: Spouse/Significant Other, Children, Friends Type of Home: House  Primary Care Giver: Self  Patient Support Systems include:

## 2024-06-26 NOTE — ACP (ADVANCE CARE PLANNING)
Advance Care Planning   Healthcare Decision Maker:      Click here to complete Healthcare Decision Makers including selection of the Healthcare Decision Maker Relationship (ie \"Primary\").          pt says he has advance directives, will need copy. TEMO Santana  6/26/2024

## 2024-06-26 NOTE — H&P
History & Physicial  David Moyer  [id number]  1965 06/26/24  Primary Care:  Vern Jacques MD  [address] [facility] / [place] [address]        Chief Complaint   Patient presents with    Shortness of Breath     Patient sent by PCP for CHF and bilateral leg swelling        HPI:  Patient is a 58 year old male who was sent into Er by PCP due to possible chf and bilateral leg swelling. Patient reports that he has not felt well over the last month with acute worsening over the last 2 weeks. He states that he has not been eating or drinking much. He admits to extreme fatigue. He is only able to sleep 1 hour at night. He was feeling short of breath over the last few days. He has had generalized body aches and abdominal pain. He was started on zetia a few weeks ago. Patient in ER found to have RUFINA, Hyponatremia, elevated LFTs and leukocytosis. CT scan of chest shows elevation of right tamica diaphragm, CT abdomen and pelvis shows subsegmental consolidation of right lung base with air bronchogram and porcelain gallbladder. He was treated with doxycycline and ceftriaxone. VQ scan showed low probability of PE. He was admitted for further management. Overnight patient did not sleep well. He only slept for maybe an hour. Melatonin was ineffective. He states he still feels extremely fatigued. He is currently off oxygen.     Prior to Visit Medications    Medication Sig Taking? Authorizing Provider   Insulin Degludec (TRESIBA FLEXTOUCH) 200 UNIT/ML SOPN Inject 100 Units into the skin daily Yes Cammy Mcmanus MD   fenofibric acid (TRILIPIX) 135 MG CPDR capsule Take 1 capsule by mouth daily Yes Cammy Mcmanus MD   Icosapent Ethyl (VASCEPA) 1 g CAPS capsule Take 2 capsules by mouth daily Yes Cammy Mcmanus MD   Multiple Vitamins-Minerals (THERAPEUTIC MULTIVITAMIN-MINERALS) tablet Take 1 tablet by mouth daily Yes Cammy Mcmanus MD   polycarbophil (FIBERCON) 625 MG tablet Take 1,350 mg by

## 2024-06-27 PROBLEM — R06.03 RESPIRATORY DISTRESS: Status: ACTIVE | Noted: 2024-06-27

## 2024-06-27 LAB
ALBUMIN SERPL-MCNC: 2.5 G/DL (ref 3.5–5.2)
ALP SERPL-CCNC: 72 U/L (ref 40–129)
ALT SERPL-CCNC: 89 U/L (ref 0–40)
ANA SER QL IA: NEGATIVE
ANION GAP SERPL CALCULATED.3IONS-SCNC: 12 MMOL/L (ref 7–16)
ANION GAP SERPL CALCULATED.3IONS-SCNC: 12 MMOL/L (ref 7–16)
AST SERPL-CCNC: 100 U/L (ref 0–39)
ATYPICAL LYMPHOCYTE ABSOLUTE COUNT: 2.54 K/UL (ref 0–0.46)
ATYPICAL LYMPHOCYTES: 20 % (ref 0–4)
BASOPHILS # BLD: 0 K/UL (ref 0–0.2)
BASOPHILS NFR BLD: 0 % (ref 0–2)
BILIRUB SERPL-MCNC: 0.4 MG/DL (ref 0–1.2)
BNP SERPL-MCNC: 50 PG/ML (ref 0–125)
BUN SERPL-MCNC: 37 MG/DL (ref 6–23)
BUN SERPL-MCNC: 39 MG/DL (ref 6–23)
C3 SERPL-MCNC: 114 MG/DL (ref 90–180)
C4 SERPL-MCNC: 30 MG/DL (ref 10–40)
CALCIUM SERPL-MCNC: 7.9 MG/DL (ref 8.6–10.2)
CALCIUM SERPL-MCNC: 8 MG/DL (ref 8.6–10.2)
CHLORIDE SERPL-SCNC: 105 MMOL/L (ref 98–107)
CHLORIDE SERPL-SCNC: 99 MMOL/L (ref 98–107)
CO2 SERPL-SCNC: 19 MMOL/L (ref 22–29)
CO2 SERPL-SCNC: 22 MMOL/L (ref 22–29)
CREAT SERPL-MCNC: 2 MG/DL (ref 0.7–1.2)
CREAT SERPL-MCNC: 2.1 MG/DL (ref 0.7–1.2)
EOSINOPHIL # BLD: 0.13 K/UL (ref 0.05–0.5)
EOSINOPHILS RELATIVE PERCENT: 1 % (ref 0–6)
ERYTHROCYTE [DISTWIDTH] IN BLOOD BY AUTOMATED COUNT: 15.9 % (ref 11.5–15)
ERYTHROCYTE [SEDIMENTATION RATE] IN BLOOD BY WESTERGREN METHOD: 16 MM/HR (ref 0–15)
GFR, ESTIMATED: 36 ML/MIN/1.73M2
GFR, ESTIMATED: 39 ML/MIN/1.73M2
GLUCOSE SERPL-MCNC: 135 MG/DL (ref 74–99)
GLUCOSE SERPL-MCNC: 158 MG/DL (ref 74–99)
HCT VFR BLD AUTO: 38.3 % (ref 37–54)
HGB BLD-MCNC: 12.7 G/DL (ref 12.5–16.5)
L PNEUMO1 AG UR QL IA.RAPID: NEGATIVE
LYMPHOCYTES NFR BLD: 5.72 K/UL (ref 1.5–4)
LYMPHOCYTES RELATIVE PERCENT: 45 % (ref 20–42)
MAGNESIUM SERPL-MCNC: 2.1 MG/DL (ref 1.6–2.6)
MCH RBC QN AUTO: 31.4 PG (ref 26–35)
MCHC RBC AUTO-ENTMCNC: 33.2 G/DL (ref 32–34.5)
MCV RBC AUTO: 94.6 FL (ref 80–99.9)
MONOCYTES NFR BLD: 0.64 K/UL (ref 0.1–0.95)
MONOCYTES NFR BLD: 5 % (ref 2–12)
NEUTROPHILS NFR BLD: 29 % (ref 43–80)
NEUTS SEG NFR BLD: 3.68 K/UL (ref 1.8–7.3)
OSMOLALITY UR: 388 MOSM/KG (ref 300–900)
PHOSPHATE SERPL-MCNC: 3.4 MG/DL (ref 2.5–4.5)
PLATELET # BLD AUTO: 202 K/UL (ref 130–450)
PMV BLD AUTO: 10.8 FL (ref 7–12)
POTASSIUM SERPL-SCNC: 4.4 MMOL/L (ref 3.5–5)
POTASSIUM SERPL-SCNC: 5.5 MMOL/L (ref 3.5–5)
PROCALCITONIN SERPL-MCNC: 0.46 NG/ML (ref 0–0.08)
PROT SERPL-MCNC: 5 G/DL (ref 6.4–8.3)
RBC # BLD AUTO: 4.05 M/UL (ref 3.8–5.8)
RBC # BLD: ABNORMAL 10*6/UL
S PNEUM AG SPEC QL: NEGATIVE
SODIUM SERPL-SCNC: 133 MMOL/L (ref 132–146)
SODIUM SERPL-SCNC: 136 MMOL/L (ref 132–146)
SPECIMEN SOURCE: NORMAL
TROPONIN I SERPL HS-MCNC: 31 NG/L (ref 0–11)
WBC # BLD: ABNORMAL 10*3/UL
WBC OTHER # BLD: 12.7 K/UL (ref 4.5–11.5)

## 2024-06-27 PROCEDURE — 6370000000 HC RX 637 (ALT 250 FOR IP)

## 2024-06-27 PROCEDURE — 94664 DEMO&/EVAL PT USE INHALER: CPT

## 2024-06-27 PROCEDURE — 80048 BASIC METABOLIC PNL TOTAL CA: CPT

## 2024-06-27 PROCEDURE — 83880 ASSAY OF NATRIURETIC PEPTIDE: CPT

## 2024-06-27 PROCEDURE — 84145 PROCALCITONIN (PCT): CPT

## 2024-06-27 PROCEDURE — 2060000000 HC ICU INTERMEDIATE R&B

## 2024-06-27 PROCEDURE — 85025 COMPLETE CBC W/AUTO DIFF WBC: CPT

## 2024-06-27 PROCEDURE — 85652 RBC SED RATE AUTOMATED: CPT

## 2024-06-27 PROCEDURE — 6360000002 HC RX W HCPCS: Performed by: STUDENT IN AN ORGANIZED HEALTH CARE EDUCATION/TRAINING PROGRAM

## 2024-06-27 PROCEDURE — 6370000000 HC RX 637 (ALT 250 FOR IP): Performed by: INTERNAL MEDICINE

## 2024-06-27 PROCEDURE — 6370000000 HC RX 637 (ALT 250 FOR IP): Performed by: NURSE PRACTITIONER

## 2024-06-27 PROCEDURE — 86039 ANTINUCLEAR ANTIBODIES (ANA): CPT

## 2024-06-27 PROCEDURE — 36415 COLL VENOUS BLD VENIPUNCTURE: CPT

## 2024-06-27 PROCEDURE — 86038 ANTINUCLEAR ANTIBODIES: CPT

## 2024-06-27 PROCEDURE — 2700000000 HC OXYGEN THERAPY PER DAY

## 2024-06-27 PROCEDURE — 94667 MNPJ CHEST WALL 1ST: CPT

## 2024-06-27 PROCEDURE — 84155 ASSAY OF PROTEIN SERUM: CPT

## 2024-06-27 PROCEDURE — 84100 ASSAY OF PHOSPHORUS: CPT

## 2024-06-27 PROCEDURE — 94669 MECHANICAL CHEST WALL OSCILL: CPT

## 2024-06-27 PROCEDURE — 83735 ASSAY OF MAGNESIUM: CPT

## 2024-06-27 PROCEDURE — 6360000002 HC RX W HCPCS: Performed by: INTERNAL MEDICINE

## 2024-06-27 PROCEDURE — 84484 ASSAY OF TROPONIN QUANT: CPT

## 2024-06-27 PROCEDURE — 2580000003 HC RX 258: Performed by: STUDENT IN AN ORGANIZED HEALTH CARE EDUCATION/TRAINING PROGRAM

## 2024-06-27 PROCEDURE — APPSS180 APP SPLIT SHARED TIME > 60 MINUTES: Performed by: NURSE PRACTITIONER

## 2024-06-27 PROCEDURE — 84165 PROTEIN E-PHORESIS SERUM: CPT

## 2024-06-27 PROCEDURE — 6370000000 HC RX 637 (ALT 250 FOR IP): Performed by: STUDENT IN AN ORGANIZED HEALTH CARE EDUCATION/TRAINING PROGRAM

## 2024-06-27 PROCEDURE — 6360000002 HC RX W HCPCS

## 2024-06-27 PROCEDURE — 80053 COMPREHEN METABOLIC PANEL: CPT

## 2024-06-27 PROCEDURE — 99222 1ST HOSP IP/OBS MODERATE 55: CPT | Performed by: INTERNAL MEDICINE

## 2024-06-27 PROCEDURE — 94640 AIRWAY INHALATION TREATMENT: CPT

## 2024-06-27 PROCEDURE — 86160 COMPLEMENT ANTIGEN: CPT

## 2024-06-27 RX ORDER — IPRATROPIUM BROMIDE AND ALBUTEROL SULFATE 2.5; .5 MG/3ML; MG/3ML
1 SOLUTION RESPIRATORY (INHALATION)
Status: DISCONTINUED | OUTPATIENT
Start: 2024-06-27 | End: 2024-06-29

## 2024-06-27 RX ORDER — BUMETANIDE 0.25 MG/ML
1 INJECTION INTRAMUSCULAR; INTRAVENOUS 2 TIMES DAILY
Status: DISCONTINUED | OUTPATIENT
Start: 2024-06-27 | End: 2024-06-28

## 2024-06-27 RX ADMIN — SODIUM BICARBONATE 650 MG: 650 TABLET ORAL at 08:57

## 2024-06-27 RX ADMIN — BUMETANIDE 1 MG: 0.25 INJECTION INTRAMUSCULAR; INTRAVENOUS at 08:57

## 2024-06-27 RX ADMIN — IPRATROPIUM BROMIDE AND ALBUTEROL SULFATE 1 DOSE: 2.5; .5 SOLUTION RESPIRATORY (INHALATION) at 12:21

## 2024-06-27 RX ADMIN — ONDANSETRON 4 MG: 2 INJECTION INTRAMUSCULAR; INTRAVENOUS at 18:21

## 2024-06-27 RX ADMIN — IPRATROPIUM BROMIDE AND ALBUTEROL SULFATE 1 DOSE: 2.5; .5 SOLUTION RESPIRATORY (INHALATION) at 19:24

## 2024-06-27 RX ADMIN — PIPERACILLIN AND TAZOBACTAM 3375 MG: 3; .375 INJECTION, POWDER, LYOPHILIZED, FOR SOLUTION INTRAVENOUS at 11:35

## 2024-06-27 RX ADMIN — SODIUM BICARBONATE 650 MG: 650 TABLET ORAL at 21:23

## 2024-06-27 RX ADMIN — PIPERACILLIN AND TAZOBACTAM 3375 MG: 3; .375 INJECTION, POWDER, LYOPHILIZED, FOR SOLUTION INTRAVENOUS at 19:18

## 2024-06-27 RX ADMIN — SODIUM ZIRCONIUM CYCLOSILICATE 10 G: 10 POWDER, FOR SUSPENSION ORAL at 11:36

## 2024-06-27 RX ADMIN — ACETAMINOPHEN 650 MG: 325 TABLET ORAL at 21:28

## 2024-06-27 RX ADMIN — ONDANSETRON 4 MG: 2 INJECTION INTRAMUSCULAR; INTRAVENOUS at 08:57

## 2024-06-27 RX ADMIN — ENOXAPARIN SODIUM 30 MG: 100 INJECTION SUBCUTANEOUS at 08:57

## 2024-06-27 RX ADMIN — ENOXAPARIN SODIUM 30 MG: 100 INJECTION SUBCUTANEOUS at 21:23

## 2024-06-27 RX ADMIN — SODIUM CHLORIDE, PRESERVATIVE FREE 10 ML: 5 INJECTION INTRAVENOUS at 21:33

## 2024-06-27 RX ADMIN — METRONIDAZOLE 500 MG: 500 INJECTION, SOLUTION INTRAVENOUS at 09:02

## 2024-06-27 RX ADMIN — IPRATROPIUM BROMIDE AND ALBUTEROL SULFATE 1 DOSE: 2.5; .5 SOLUTION RESPIRATORY (INHALATION) at 16:34

## 2024-06-27 RX ADMIN — METRONIDAZOLE 500 MG: 500 INJECTION, SOLUTION INTRAVENOUS at 00:17

## 2024-06-27 RX ADMIN — ASPIRIN 81 MG 81 MG: 81 TABLET ORAL at 08:56

## 2024-06-27 RX ADMIN — BUMETANIDE 1 MG: 0.25 INJECTION INTRAMUSCULAR; INTRAVENOUS at 16:34

## 2024-06-27 RX ADMIN — SODIUM CHLORIDE, PRESERVATIVE FREE 10 ML: 5 INJECTION INTRAVENOUS at 08:57

## 2024-06-27 RX ADMIN — Medication 6 MG: at 21:23

## 2024-06-27 RX ADMIN — ZOLPIDEM TARTRATE 5 MG: 5 TABLET ORAL at 21:23

## 2024-06-27 ASSESSMENT — PAIN SCALES - GENERAL
PAINLEVEL_OUTOF10: 0
PAINLEVEL_OUTOF10: 3

## 2024-06-27 ASSESSMENT — PAIN DESCRIPTION - LOCATION: LOCATION: HEAD

## 2024-06-27 ASSESSMENT — PAIN DESCRIPTION - DESCRIPTORS: DESCRIPTORS: ACHING;THROBBING

## 2024-06-27 NOTE — CARE COORDINATION
SOCIAL WORK/CASEMANAGEMENT TRANSITION OF CARE PLANNING( ELKE RIYA, [phone]): pt is independent in the room. His pot is 5.e and creatine 2.0 with baseline of 1.2-1.5. pt is on iv bumex, rocephin an flagyl. General surgery has signed off. Plan is home with wife. No needs anticipated. Renal is following Elke Vann, TEMO  6/27/2024

## 2024-06-28 ENCOUNTER — APPOINTMENT (OUTPATIENT)
Dept: GENERAL RADIOLOGY | Age: 59
DRG: 871 | End: 2024-06-28
Payer: COMMERCIAL

## 2024-06-28 ENCOUNTER — APPOINTMENT (OUTPATIENT)
Age: 59
DRG: 871 | End: 2024-06-28
Payer: COMMERCIAL

## 2024-06-28 LAB
ALBUMIN SERPL-MCNC: 2 G/DL (ref 3.5–4.7)
ALBUMIN SERPL-MCNC: 2.6 G/DL (ref 3.5–5.2)
ALP SERPL-CCNC: 98 U/L (ref 40–129)
ALPHA1 GLOB SERPL ELPH-MCNC: 0.3 G/DL (ref 0.2–0.4)
ALPHA2 GLOB SERPL ELPH-MCNC: 0.5 G/DL (ref 0.5–1)
ALT SERPL-CCNC: 101 U/L (ref 0–40)
ANION GAP SERPL CALCULATED.3IONS-SCNC: 13 MMOL/L (ref 7–16)
AST SERPL-CCNC: 156 U/L (ref 0–39)
ATYPICAL LYMPHOCYTE ABSOLUTE COUNT: 0.28 K/UL (ref 0–0.46)
ATYPICAL LYMPHOCYTES: 2 % (ref 0–4)
B-GLOBULIN SERPL ELPH-MCNC: 0.8 G/DL (ref 0.8–1.3)
BASOPHILS # BLD: 0 K/UL (ref 0–0.2)
BASOPHILS NFR BLD: 0 % (ref 0–2)
BILIRUB SERPL-MCNC: 0.5 MG/DL (ref 0–1.2)
BUN SERPL-MCNC: 40 MG/DL (ref 6–23)
CALCIUM SERPL-MCNC: 7.9 MG/DL (ref 8.6–10.2)
CHLORIDE SERPL-SCNC: 101 MMOL/L (ref 98–107)
CO2 SERPL-SCNC: 21 MMOL/L (ref 22–29)
CREAT SERPL-MCNC: 2.3 MG/DL (ref 0.7–1.2)
CRP SERPL HS-MCNC: 52 MG/L (ref 0–5)
ECHO AO ASC DIAM: 3.4 CM
ECHO AO ASCENDING AORTA INDEX: 1.32 CM/M2
ECHO AV AREA PEAK VELOCITY: 3.4 CM2
ECHO AV AREA VTI: 4.4 CM2
ECHO AV AREA/BSA PEAK VELOCITY: 1.3 CM2/M2
ECHO AV AREA/BSA VTI: 1.7 CM2/M2
ECHO AV CUSP MM: 2.5 CM
ECHO AV MEAN GRADIENT: 5 MMHG
ECHO AV MEAN VELOCITY: 1.1 M/S
ECHO AV PEAK GRADIENT: 9 MMHG
ECHO AV PEAK VELOCITY: 1.5 M/S
ECHO AV VELOCITY RATIO: 0.73
ECHO AV VTI: 19.8 CM
ECHO BSA: 2.38 M2
ECHO LA DIAMETER INDEX: 1.87 CM/M2
ECHO LA DIAMETER: 4.8 CM
ECHO LA VOL A-L A2C: 47 ML (ref 18–58)
ECHO LA VOL A-L A4C: 78 ML (ref 18–58)
ECHO LA VOL BP: 62 ML (ref 18–58)
ECHO LA VOL MOD A2C: 44 ML (ref 18–58)
ECHO LA VOL MOD A4C: 77 ML (ref 18–58)
ECHO LA VOL/BSA BIPLANE: 24 ML/M2 (ref 16–34)
ECHO LA VOLUME AREA LENGTH: 65 ML
ECHO LA VOLUME INDEX A-L A2C: 18 ML/M2 (ref 16–34)
ECHO LA VOLUME INDEX A-L A4C: 30 ML/M2 (ref 16–34)
ECHO LA VOLUME INDEX AREA LENGTH: 25 ML/M2 (ref 16–34)
ECHO LA VOLUME INDEX MOD A2C: 17 ML/M2 (ref 16–34)
ECHO LA VOLUME INDEX MOD A4C: 30 ML/M2 (ref 16–34)
ECHO LV FRACTIONAL SHORTENING: 50 % (ref 28–44)
ECHO LV INTERNAL DIMENSION DIASTOLE INDEX: 1.95 CM/M2
ECHO LV INTERNAL DIMENSION DIASTOLIC: 5 CM (ref 4.2–5.9)
ECHO LV INTERNAL DIMENSION SYSTOLIC INDEX: 0.97 CM/M2
ECHO LV INTERNAL DIMENSION SYSTOLIC: 2.5 CM
ECHO LV ISOVOLUMETRIC RELAXATION TIME (IVRT): 95.2 MS
ECHO LV IVSD: 1.2 CM (ref 0.6–1)
ECHO LV MASS 2D: 247.6 G (ref 88–224)
ECHO LV MASS INDEX 2D: 96.3 G/M2 (ref 49–115)
ECHO LV POSTERIOR WALL DIASTOLIC: 1.3 CM (ref 0.6–1)
ECHO LV RELATIVE WALL THICKNESS RATIO: 0.52
ECHO LVOT AREA: 4.2 CM2
ECHO LVOT AV VTI INDEX: 1.02
ECHO LVOT DIAM: 2.3 CM
ECHO LVOT MEAN GRADIENT: 3 MMHG
ECHO LVOT PEAK GRADIENT: 5 MMHG
ECHO LVOT PEAK VELOCITY: 1.1 M/S
ECHO LVOT STROKE VOLUME INDEX: 32.5 ML/M2
ECHO LVOT SV: 83.5 ML
ECHO LVOT VTI: 20.1 CM
ECHO MV A VELOCITY: 0.81 M/S
ECHO MV AREA PHT: 5.3 CM2
ECHO MV AREA VTI: 5.2 CM2
ECHO MV E VELOCITY: 0.57 M/S
ECHO MV E/A RATIO: 0.7
ECHO MV LVOT VTI INDEX: 0.81
ECHO MV MAX VELOCITY: 0.9 M/S
ECHO MV MEAN GRADIENT: 2 MMHG
ECHO MV MEAN VELOCITY: 0.7 M/S
ECHO MV PEAK GRADIENT: 3 MMHG
ECHO MV PRESSURE HALF TIME (PHT): 41.2 MS
ECHO MV VTI: 16.2 CM
ECHO PV MAX VELOCITY: 1.3 M/S
ECHO PV MEAN GRADIENT: 4 MMHG
ECHO PV MEAN VELOCITY: 0.9 M/S
ECHO PV PEAK GRADIENT: 7 MMHG
ECHO PV VTI: 16.2 CM
ECHO RV INTERNAL DIMENSION: 4.4 CM
ECHO RV LONGITUDINAL DIMENSION: 6.9 CM
ECHO RV MID DIMENSION: 2.7 CM
EOSINOPHIL # BLD: 0.14 K/UL (ref 0.05–0.5)
EOSINOPHILS RELATIVE PERCENT: 1 % (ref 0–6)
ERYTHROCYTE [DISTWIDTH] IN BLOOD BY AUTOMATED COUNT: 16.5 % (ref 11.5–15)
GAMMA GLOB SERPL ELPH-MCNC: 1 G/DL (ref 0.7–1.6)
GFR, ESTIMATED: 31 ML/MIN/1.73M2
GLUCOSE SERPL-MCNC: 216 MG/DL (ref 74–99)
HCT VFR BLD AUTO: 38.9 % (ref 37–54)
HGB BLD-MCNC: 12.9 G/DL (ref 12.5–16.5)
LYMPHOCYTES NFR BLD: 2.63 K/UL (ref 1.5–4)
LYMPHOCYTES RELATIVE PERCENT: 17 % (ref 20–42)
MAGNESIUM SERPL-MCNC: 1.9 MG/DL (ref 1.6–2.6)
MCH RBC QN AUTO: 31.5 PG (ref 26–35)
MCHC RBC AUTO-ENTMCNC: 33.2 G/DL (ref 32–34.5)
MCV RBC AUTO: 94.9 FL (ref 80–99.9)
MONOCYTES NFR BLD: 1.39 K/UL (ref 0.1–0.95)
MONOCYTES NFR BLD: 9 % (ref 2–12)
NEUTROPHILS NFR BLD: 72 % (ref 43–80)
NEUTS SEG NFR BLD: 11.36 K/UL (ref 1.8–7.3)
P E INTERPRETATION, U: NORMAL
PATHOLOGIST: ABNORMAL
PATHOLOGIST: NORMAL
PHOSPHATE SERPL-MCNC: 4.1 MG/DL (ref 2.5–4.5)
PLATELET # BLD AUTO: 197 K/UL (ref 130–450)
PMV BLD AUTO: 10.8 FL (ref 7–12)
POTASSIUM SERPL-SCNC: 4.8 MMOL/L (ref 3.5–5)
PROCALCITONIN SERPL-MCNC: 0.7 NG/ML (ref 0–0.08)
PROT PATTERN SERPL ELPH-IMP: ABNORMAL
PROT SERPL-MCNC: 4.6 G/DL (ref 6.4–8.3)
PROT SERPL-MCNC: 5.1 G/DL (ref 6.4–8.3)
RBC # BLD AUTO: 4.1 M/UL (ref 3.8–5.8)
RBC # BLD: ABNORMAL 10*6/UL
SODIUM SERPL-SCNC: 135 MMOL/L (ref 132–146)
SPECIMEN TYPE: NORMAL
SPECIMEN TYPE: NORMAL
URINE IFX INTERP: NORMAL
WBC OTHER # BLD: 15.8 K/UL (ref 4.5–11.5)

## 2024-06-28 PROCEDURE — 6360000002 HC RX W HCPCS

## 2024-06-28 PROCEDURE — 2700000000 HC OXYGEN THERAPY PER DAY

## 2024-06-28 PROCEDURE — 2580000003 HC RX 258

## 2024-06-28 PROCEDURE — 85025 COMPLETE CBC W/AUTO DIFF WBC: CPT

## 2024-06-28 PROCEDURE — 84100 ASSAY OF PHOSPHORUS: CPT

## 2024-06-28 PROCEDURE — 94640 AIRWAY INHALATION TREATMENT: CPT

## 2024-06-28 PROCEDURE — 99232 SBSQ HOSP IP/OBS MODERATE 35: CPT | Performed by: INTERNAL MEDICINE

## 2024-06-28 PROCEDURE — 2580000003 HC RX 258: Performed by: STUDENT IN AN ORGANIZED HEALTH CARE EDUCATION/TRAINING PROGRAM

## 2024-06-28 PROCEDURE — 6370000000 HC RX 637 (ALT 250 FOR IP): Performed by: NURSE PRACTITIONER

## 2024-06-28 PROCEDURE — P9047 ALBUMIN (HUMAN), 25%, 50ML: HCPCS

## 2024-06-28 PROCEDURE — 83735 ASSAY OF MAGNESIUM: CPT

## 2024-06-28 PROCEDURE — 93306 TTE W/DOPPLER COMPLETE: CPT

## 2024-06-28 PROCEDURE — 6370000000 HC RX 637 (ALT 250 FOR IP): Performed by: STUDENT IN AN ORGANIZED HEALTH CARE EDUCATION/TRAINING PROGRAM

## 2024-06-28 PROCEDURE — 36415 COLL VENOUS BLD VENIPUNCTURE: CPT

## 2024-06-28 PROCEDURE — 80053 COMPREHEN METABOLIC PANEL: CPT

## 2024-06-28 PROCEDURE — 6370000000 HC RX 637 (ALT 250 FOR IP): Performed by: INTERNAL MEDICINE

## 2024-06-28 PROCEDURE — 93306 TTE W/DOPPLER COMPLETE: CPT | Performed by: INTERNAL MEDICINE

## 2024-06-28 PROCEDURE — 84145 PROCALCITONIN (PCT): CPT

## 2024-06-28 PROCEDURE — 71045 X-RAY EXAM CHEST 1 VIEW: CPT

## 2024-06-28 PROCEDURE — 2060000000 HC ICU INTERMEDIATE R&B

## 2024-06-28 PROCEDURE — 6360000002 HC RX W HCPCS: Performed by: STUDENT IN AN ORGANIZED HEALTH CARE EDUCATION/TRAINING PROGRAM

## 2024-06-28 PROCEDURE — 6370000000 HC RX 637 (ALT 250 FOR IP)

## 2024-06-28 PROCEDURE — 86140 C-REACTIVE PROTEIN: CPT

## 2024-06-28 PROCEDURE — 94669 MECHANICAL CHEST WALL OSCILL: CPT

## 2024-06-28 PROCEDURE — 6360000004 HC RX CONTRAST MEDICATION

## 2024-06-28 RX ORDER — ALBUMIN (HUMAN) 12.5 G/50ML
25 SOLUTION INTRAVENOUS EVERY 8 HOURS
Status: COMPLETED | OUTPATIENT
Start: 2024-06-28 | End: 2024-06-29

## 2024-06-28 RX ORDER — PROCHLORPERAZINE EDISYLATE 5 MG/ML
10 INJECTION INTRAMUSCULAR; INTRAVENOUS EVERY 6 HOURS PRN
Status: DISCONTINUED | OUTPATIENT
Start: 2024-06-28 | End: 2024-07-04 | Stop reason: HOSPADM

## 2024-06-28 RX ADMIN — ACETAMINOPHEN 650 MG: 325 TABLET ORAL at 15:05

## 2024-06-28 RX ADMIN — SODIUM BICARBONATE 650 MG: 650 TABLET ORAL at 08:53

## 2024-06-28 RX ADMIN — ONDANSETRON 4 MG: 2 INJECTION INTRAMUSCULAR; INTRAVENOUS at 17:30

## 2024-06-28 RX ADMIN — IPRATROPIUM BROMIDE AND ALBUTEROL SULFATE 1 DOSE: 2.5; .5 SOLUTION RESPIRATORY (INHALATION) at 20:23

## 2024-06-28 RX ADMIN — ALBUMIN (HUMAN) 25 G: 0.25 INJECTION, SOLUTION INTRAVENOUS at 21:24

## 2024-06-28 RX ADMIN — BUMETANIDE 0.5 MG/HR: 0.25 INJECTION INTRAMUSCULAR; INTRAVENOUS at 15:03

## 2024-06-28 RX ADMIN — ENOXAPARIN SODIUM 30 MG: 100 INJECTION SUBCUTANEOUS at 21:24

## 2024-06-28 RX ADMIN — IPRATROPIUM BROMIDE AND ALBUTEROL SULFATE 1 DOSE: 2.5; .5 SOLUTION RESPIRATORY (INHALATION) at 17:48

## 2024-06-28 RX ADMIN — ASPIRIN 81 MG 81 MG: 81 TABLET ORAL at 08:52

## 2024-06-28 RX ADMIN — ALBUMIN (HUMAN) 25 G: 0.25 INJECTION, SOLUTION INTRAVENOUS at 12:21

## 2024-06-28 RX ADMIN — ACETAMINOPHEN 650 MG: 325 TABLET ORAL at 22:05

## 2024-06-28 RX ADMIN — SODIUM CHLORIDE, PRESERVATIVE FREE 10 ML: 5 INJECTION INTRAVENOUS at 08:53

## 2024-06-28 RX ADMIN — SODIUM BICARBONATE 650 MG: 650 TABLET ORAL at 21:26

## 2024-06-28 RX ADMIN — Medication 6 MG: at 21:25

## 2024-06-28 RX ADMIN — IPRATROPIUM BROMIDE AND ALBUTEROL SULFATE 1 DOSE: 2.5; .5 SOLUTION RESPIRATORY (INHALATION) at 10:14

## 2024-06-28 RX ADMIN — IPRATROPIUM BROMIDE AND ALBUTEROL SULFATE 1 DOSE: 2.5; .5 SOLUTION RESPIRATORY (INHALATION) at 13:40

## 2024-06-28 RX ADMIN — SODIUM CHLORIDE, PRESERVATIVE FREE 10 ML: 5 INJECTION INTRAVENOUS at 21:26

## 2024-06-28 RX ADMIN — PIPERACILLIN AND TAZOBACTAM 3375 MG: 3; .375 INJECTION, POWDER, LYOPHILIZED, FOR SOLUTION INTRAVENOUS at 13:26

## 2024-06-28 RX ADMIN — PIPERACILLIN AND TAZOBACTAM 3375 MG: 3; .375 INJECTION, POWDER, LYOPHILIZED, FOR SOLUTION INTRAVENOUS at 22:11

## 2024-06-28 RX ADMIN — ZOLPIDEM TARTRATE 5 MG: 5 TABLET ORAL at 21:26

## 2024-06-28 RX ADMIN — PIPERACILLIN AND TAZOBACTAM 3375 MG: 3; .375 INJECTION, POWDER, LYOPHILIZED, FOR SOLUTION INTRAVENOUS at 04:40

## 2024-06-28 RX ADMIN — PERFLUTREN 1.5 ML: 6.52 INJECTION, SUSPENSION INTRAVENOUS at 16:00

## 2024-06-28 RX ADMIN — ENOXAPARIN SODIUM 30 MG: 100 INJECTION SUBCUTANEOUS at 08:52

## 2024-06-28 ASSESSMENT — PAIN DESCRIPTION - ONSET
ONSET: GRADUAL
ONSET: GRADUAL

## 2024-06-28 ASSESSMENT — PAIN DESCRIPTION - LOCATION
LOCATION: HEAD
LOCATION: HEAD

## 2024-06-28 ASSESSMENT — PAIN DESCRIPTION - PAIN TYPE
TYPE: ACUTE PAIN
TYPE: ACUTE PAIN

## 2024-06-28 ASSESSMENT — PAIN SCALES - GENERAL
PAINLEVEL_OUTOF10: 3
PAINLEVEL_OUTOF10: 5
PAINLEVEL_OUTOF10: 2

## 2024-06-28 ASSESSMENT — PAIN DESCRIPTION - DESCRIPTORS
DESCRIPTORS: ACHING
DESCRIPTORS: ACHING;SORE;DISCOMFORT;DULL

## 2024-06-28 ASSESSMENT — PAIN DESCRIPTION - FREQUENCY: FREQUENCY: INTERMITTENT

## 2024-06-28 NOTE — CARE COORDINATION
SOCIAL WORK/CASEMANAGEMENT TRANSITION OF CARE PLANNING( ELKE RIYA, [phone]): provided pt with dme list in case he requires home o2. Pt is on 8l hf o2 and without home o2. He is on aerosols and iv bumex and zosyn. General surgery to follow up with pt for outpatient cholecystectomy. TTE pending with cardio and renal following. Creatine is 2.3 today and baseline is 1.2 -1.5. plan is home will follow. Elke Vann, TEMO  6/28/2024

## 2024-06-29 LAB
ABSOLUTE PLASMA CELLS: 0.15 K/UL
ALANINE AMINOTRANSFERASE, FIBROMETER: 147 U/L (ref 5–50)
ALBUMIN SERPL-MCNC: 3.2 G/DL (ref 3.5–5.2)
ALP SERPL-CCNC: 120 U/L (ref 40–129)
ALPHA-2-MACROGLOBULIN, FIBROMETER: 196 MG/DL (ref 131–293)
ALT SERPL-CCNC: 107 U/L (ref 0–40)
ANION GAP SERPL CALCULATED.3IONS-SCNC: 15 MMOL/L (ref 7–16)
APAP SERPL-MCNC: <5 UG/ML (ref 10–30)
ASPARTATE AMINOTRANSFERASE, FIBROMETER: 158 U/L (ref 9–50)
AST SERPL-CCNC: 176 U/L (ref 0–39)
BASOPHILS # BLD: 0 K/UL (ref 0–0.2)
BASOPHILS # BLD: 0 K/UL (ref 0–0.2)
BASOPHILS NFR BLD: 0 % (ref 0–2)
BASOPHILS NFR BLD: 0 % (ref 0–2)
BILIRUB SERPL-MCNC: 0.6 MG/DL (ref 0–1.2)
BUN SERPL-MCNC: 35 MG/DL (ref 6–23)
CALCIUM SERPL-MCNC: 8.3 MG/DL (ref 8.6–10.2)
CHLORIDE SERPL-SCNC: 101 MMOL/L (ref 98–107)
CIRRHOMETER PATIENT SCORE: 0.47
CO2 SERPL-SCNC: 23 MMOL/L (ref 22–29)
CREAT SERPL-MCNC: 2.3 MG/DL (ref 0.7–1.2)
EER FIBROMETER REPORT: ABNORMAL
EOSINOPHIL # BLD: 0.15 K/UL (ref 0.05–0.5)
EOSINOPHIL # BLD: 0.53 K/UL (ref 0.05–0.5)
EOSINOPHILS RELATIVE PERCENT: 1 % (ref 0–6)
EOSINOPHILS RELATIVE PERCENT: 3 % (ref 0–6)
ERYTHROCYTE [DISTWIDTH] IN BLOOD BY AUTOMATED COUNT: 17.2 % (ref 11.5–15)
ERYTHROCYTE [DISTWIDTH] IN BLOOD BY AUTOMATED COUNT: 17.4 % (ref 11.5–15)
FERRITIN SERPL-MCNC: 6048 NG/ML
FIBROMETER INTERPRETATION: ABNORMAL
FIBROMETER PATIENT SCORE: 0.94
FIBROMETER PLATELET COUNT: 211
FIBROMETER PROTHROMBIN INDEX: 63 % (ref 90–120)
FIBROSIS METAVIR CLASSIFICATION: ABNORMAL
GAMMA GLUTAMYL TRANSFERASE, FIBROMETER: 33 U/L (ref 7–51)
GFR, ESTIMATED: 31 ML/MIN/1.73M2
GLUCOSE SERPL-MCNC: 70 MG/DL (ref 74–99)
HCT VFR BLD AUTO: 39.6 % (ref 37–54)
HCT VFR BLD AUTO: 40.8 % (ref 37–54)
HGB BLD-MCNC: 12.9 G/DL (ref 12.5–16.5)
HGB BLD-MCNC: 12.9 G/DL (ref 12.5–16.5)
INFLAMETER METAVIR CLASSIFICATION: ABNORMAL
INFLAMETER PATIENT SCORE: 0.77
INR PPP: 1.2
IRON SATN MFR SERPL: 59 % (ref 20–55)
IRON SERPL-MCNC: 96 UG/DL (ref 59–158)
LYMPHOCYTES NFR BLD: 11.93 K/UL (ref 1.5–4)
LYMPHOCYTES NFR BLD: 6.51 K/UL (ref 1.5–4)
LYMPHOCYTES RELATIVE PERCENT: 44 % (ref 20–42)
LYMPHOCYTES RELATIVE PERCENT: 67 % (ref 20–42)
MAGNESIUM SERPL-MCNC: 1.9 MG/DL (ref 1.6–2.6)
MCH RBC QN AUTO: 30.4 PG (ref 26–35)
MCH RBC QN AUTO: 31.1 PG (ref 26–35)
MCHC RBC AUTO-ENTMCNC: 31.6 G/DL (ref 32–34.5)
MCHC RBC AUTO-ENTMCNC: 32.6 G/DL (ref 32–34.5)
MCV RBC AUTO: 95.4 FL (ref 80–99.9)
MCV RBC AUTO: 96 FL (ref 80–99.9)
METAMYELOCYTES ABSOLUTE COUNT: 1.63 K/UL (ref 0–0.12)
METAMYELOCYTES: 11 % (ref 0–1)
MONOCYTES NFR BLD: 0.89 K/UL (ref 0.1–0.95)
MONOCYTES NFR BLD: 1.63 K/UL (ref 0.1–0.95)
MONOCYTES NFR BLD: 11 % (ref 2–12)
MONOCYTES NFR BLD: 5 % (ref 2–12)
MYELOCYTES ABSOLUTE COUNT: 1.78 K/UL
MYELOCYTES: 12 %
NEUTROPHILS NFR BLD: 20 % (ref 43–80)
NEUTROPHILS NFR BLD: 25 % (ref 43–80)
NEUTS SEG NFR BLD: 2.96 K/UL (ref 1.8–7.3)
NEUTS SEG NFR BLD: 4.45 K/UL (ref 1.8–7.3)
NUCLEATED RED BLOOD CELLS: 2 PER 100 WBC
PHOSPHATE SERPL-MCNC: 3.9 MG/DL (ref 2.5–4.5)
PLASMA CELLS: 1 %
PLATELET # BLD AUTO: 199 K/UL (ref 130–450)
PLATELET # BLD AUTO: 210 K/UL (ref 130–450)
PMV BLD AUTO: 10.5 FL (ref 7–12)
PMV BLD AUTO: 10.7 FL (ref 7–12)
POTASSIUM SERPL-SCNC: 4.2 MMOL/L (ref 3.5–5)
PROT SERPL-MCNC: 5.9 G/DL (ref 6.4–8.3)
PROTHROMBIN TIME: 13.2 SEC (ref 9.3–12.4)
RBC # BLD AUTO: 4.15 M/UL (ref 3.8–5.8)
RBC # BLD AUTO: 4.25 M/UL (ref 3.8–5.8)
RBC # BLD: ABNORMAL 10*6/UL
SODIUM SERPL-SCNC: 139 MMOL/L (ref 132–146)
TIBC SERPL-MCNC: 163 UG/DL (ref 250–450)
UREA NITROGEN, FIBROMETER: 36 MG/DL (ref 7–20)
WBC # BLD: ABNORMAL 10*3/UL
WBC OTHER # BLD: 14.8 K/UL (ref 4.5–11.5)
WBC OTHER # BLD: 17.8 K/UL (ref 4.5–11.5)

## 2024-06-29 PROCEDURE — 6360000002 HC RX W HCPCS: Performed by: INTERNAL MEDICINE

## 2024-06-29 PROCEDURE — 83540 ASSAY OF IRON: CPT

## 2024-06-29 PROCEDURE — 2580000003 HC RX 258: Performed by: STUDENT IN AN ORGANIZED HEALTH CARE EDUCATION/TRAINING PROGRAM

## 2024-06-29 PROCEDURE — 86645 CMV ANTIBODY IGM: CPT

## 2024-06-29 PROCEDURE — 86695 HERPES SIMPLEX TYPE 1 TEST: CPT

## 2024-06-29 PROCEDURE — 6370000000 HC RX 637 (ALT 250 FOR IP)

## 2024-06-29 PROCEDURE — 82728 ASSAY OF FERRITIN: CPT

## 2024-06-29 PROCEDURE — 80074 ACUTE HEPATITIS PANEL: CPT

## 2024-06-29 PROCEDURE — 82103 ALPHA-1-ANTITRYPSIN TOTAL: CPT

## 2024-06-29 PROCEDURE — 6360000002 HC RX W HCPCS

## 2024-06-29 PROCEDURE — 86255 FLUORESCENT ANTIBODY SCREEN: CPT

## 2024-06-29 PROCEDURE — 83735 ASSAY OF MAGNESIUM: CPT

## 2024-06-29 PROCEDURE — 2580000003 HC RX 258: Performed by: INTERNAL MEDICINE

## 2024-06-29 PROCEDURE — 94669 MECHANICAL CHEST WALL OSCILL: CPT

## 2024-06-29 PROCEDURE — 6370000000 HC RX 637 (ALT 250 FOR IP): Performed by: CLINICAL NURSE SPECIALIST

## 2024-06-29 PROCEDURE — 2060000000 HC ICU INTERMEDIATE R&B

## 2024-06-29 PROCEDURE — 80053 COMPREHEN METABOLIC PANEL: CPT

## 2024-06-29 PROCEDURE — 83550 IRON BINDING TEST: CPT

## 2024-06-29 PROCEDURE — 80143 DRUG ASSAY ACETAMINOPHEN: CPT

## 2024-06-29 PROCEDURE — P9047 ALBUMIN (HUMAN), 25%, 50ML: HCPCS

## 2024-06-29 PROCEDURE — 85610 PROTHROMBIN TIME: CPT

## 2024-06-29 PROCEDURE — 6370000000 HC RX 637 (ALT 250 FOR IP): Performed by: NURSE PRACTITIONER

## 2024-06-29 PROCEDURE — 2700000000 HC OXYGEN THERAPY PER DAY

## 2024-06-29 PROCEDURE — 6360000002 HC RX W HCPCS: Performed by: STUDENT IN AN ORGANIZED HEALTH CARE EDUCATION/TRAINING PROGRAM

## 2024-06-29 PROCEDURE — 86696 HERPES SIMPLEX TYPE 2 TEST: CPT

## 2024-06-29 PROCEDURE — 81256 HFE GENE: CPT

## 2024-06-29 PROCEDURE — 36415 COLL VENOUS BLD VENIPUNCTURE: CPT

## 2024-06-29 PROCEDURE — 87040 BLOOD CULTURE FOR BACTERIA: CPT

## 2024-06-29 PROCEDURE — 94640 AIRWAY INHALATION TREATMENT: CPT

## 2024-06-29 PROCEDURE — 84100 ASSAY OF PHOSPHORUS: CPT

## 2024-06-29 PROCEDURE — 6370000000 HC RX 637 (ALT 250 FOR IP): Performed by: INTERNAL MEDICINE

## 2024-06-29 PROCEDURE — 82390 ASSAY OF CERULOPLASMIN: CPT

## 2024-06-29 PROCEDURE — 86694 HERPES SIMPLEX NES ANTBDY: CPT

## 2024-06-29 PROCEDURE — 6370000000 HC RX 637 (ALT 250 FOR IP): Performed by: STUDENT IN AN ORGANIZED HEALTH CARE EDUCATION/TRAINING PROGRAM

## 2024-06-29 PROCEDURE — 85025 COMPLETE CBC W/AUTO DIFF WBC: CPT

## 2024-06-29 PROCEDURE — 6360000002 HC RX W HCPCS: Performed by: CLINICAL NURSE SPECIALIST

## 2024-06-29 RX ORDER — HYDROCODONE BITARTRATE AND HOMATROPINE METHYLBROMIDE ORAL SOLUTION 5; 1.5 MG/5ML; MG/5ML
5 LIQUID ORAL EVERY 4 HOURS PRN
Status: DISCONTINUED | OUTPATIENT
Start: 2024-06-29 | End: 2024-07-04 | Stop reason: HOSPADM

## 2024-06-29 RX ORDER — ALBUTEROL SULFATE 2.5 MG/3ML
2.5 SOLUTION RESPIRATORY (INHALATION)
Status: DISCONTINUED | OUTPATIENT
Start: 2024-06-29 | End: 2024-06-30

## 2024-06-29 RX ORDER — GUAIFENESIN 600 MG/1
600 TABLET, EXTENDED RELEASE ORAL 2 TIMES DAILY
Status: DISCONTINUED | OUTPATIENT
Start: 2024-06-29 | End: 2024-06-29 | Stop reason: CLARIF

## 2024-06-29 RX ORDER — GUAIFENESIN 400 MG/1
400 TABLET ORAL 3 TIMES DAILY
Status: DISCONTINUED | OUTPATIENT
Start: 2024-06-29 | End: 2024-06-30

## 2024-06-29 RX ORDER — DOXYCYCLINE HYCLATE 100 MG/1
100 CAPSULE ORAL 2 TIMES DAILY
Status: DISCONTINUED | OUTPATIENT
Start: 2024-06-29 | End: 2024-07-04 | Stop reason: HOSPADM

## 2024-06-29 RX ADMIN — ALBUMIN (HUMAN) 25 G: 0.25 INJECTION, SOLUTION INTRAVENOUS at 05:44

## 2024-06-29 RX ADMIN — ACETAMINOPHEN 650 MG: 325 TABLET ORAL at 22:00

## 2024-06-29 RX ADMIN — IPRATROPIUM BROMIDE AND ALBUTEROL SULFATE 1 DOSE: 2.5; .5 SOLUTION RESPIRATORY (INHALATION) at 09:26

## 2024-06-29 RX ADMIN — ZOLPIDEM TARTRATE 5 MG: 5 TABLET ORAL at 20:44

## 2024-06-29 RX ADMIN — DOXYCYCLINE HYCLATE 100 MG: 100 CAPSULE ORAL at 14:42

## 2024-06-29 RX ADMIN — SODIUM BICARBONATE 650 MG: 650 TABLET ORAL at 09:31

## 2024-06-29 RX ADMIN — AZITHROMYCIN MONOHYDRATE 500 MG: 500 INJECTION, POWDER, LYOPHILIZED, FOR SOLUTION INTRAVENOUS at 13:06

## 2024-06-29 RX ADMIN — HYDROCODONE BITARTRATE AND HOMATROPINE METHYLBROMIDE 5 ML: 5; 1.5 SOLUTION ORAL at 10:25

## 2024-06-29 RX ADMIN — PIPERACILLIN AND TAZOBACTAM 3375 MG: 3; .375 INJECTION, POWDER, LYOPHILIZED, FOR SOLUTION INTRAVENOUS at 06:43

## 2024-06-29 RX ADMIN — DOXYCYCLINE HYCLATE 100 MG: 100 CAPSULE ORAL at 20:44

## 2024-06-29 RX ADMIN — ALBUTEROL SULFATE 2.5 MG: 2.5 SOLUTION RESPIRATORY (INHALATION) at 21:04

## 2024-06-29 RX ADMIN — IPRATROPIUM BROMIDE AND ALBUTEROL SULFATE 1 DOSE: 2.5; .5 SOLUTION RESPIRATORY (INHALATION) at 12:26

## 2024-06-29 RX ADMIN — Medication 6 MG: at 20:44

## 2024-06-29 RX ADMIN — PIPERACILLIN AND TAZOBACTAM 3375 MG: 3; .375 INJECTION, POWDER, LYOPHILIZED, FOR SOLUTION INTRAVENOUS at 22:43

## 2024-06-29 RX ADMIN — SODIUM CHLORIDE, PRESERVATIVE FREE 10 ML: 5 INJECTION INTRAVENOUS at 20:44

## 2024-06-29 RX ADMIN — GUAIFENESIN 400 MG: 400 TABLET ORAL at 14:42

## 2024-06-29 RX ADMIN — PIPERACILLIN AND TAZOBACTAM 3375 MG: 3; .375 INJECTION, POWDER, LYOPHILIZED, FOR SOLUTION INTRAVENOUS at 15:24

## 2024-06-29 RX ADMIN — ENOXAPARIN SODIUM 30 MG: 100 INJECTION SUBCUTANEOUS at 09:31

## 2024-06-29 RX ADMIN — BUMETANIDE 0.25 MG/HR: 0.25 INJECTION INTRAMUSCULAR; INTRAVENOUS at 18:25

## 2024-06-29 RX ADMIN — SODIUM BICARBONATE 650 MG: 650 TABLET ORAL at 20:44

## 2024-06-29 RX ADMIN — ALBUTEROL SULFATE 2.5 MG: 2.5 SOLUTION RESPIRATORY (INHALATION) at 15:43

## 2024-06-29 ASSESSMENT — PAIN - FUNCTIONAL ASSESSMENT: PAIN_FUNCTIONAL_ASSESSMENT: ACTIVITIES ARE NOT PREVENTED

## 2024-06-29 ASSESSMENT — PAIN DESCRIPTION - LOCATION: LOCATION: HEAD

## 2024-06-29 ASSESSMENT — PAIN SCALES - GENERAL: PAINLEVEL_OUTOF10: 8

## 2024-06-29 ASSESSMENT — PAIN DESCRIPTION - DESCRIPTORS: DESCRIPTORS: ACHING;DISCOMFORT;GNAWING

## 2024-06-30 ENCOUNTER — APPOINTMENT (OUTPATIENT)
Dept: GENERAL RADIOLOGY | Age: 59
DRG: 871 | End: 2024-06-30
Payer: COMMERCIAL

## 2024-06-30 LAB
ALBUMIN SERPL-MCNC: 3.2 G/DL (ref 3.5–5.2)
ALP SERPL-CCNC: 122 U/L (ref 40–129)
ALT SERPL-CCNC: 93 U/L (ref 0–40)
ANION GAP SERPL CALCULATED.3IONS-SCNC: 12 MMOL/L (ref 7–16)
AST SERPL-CCNC: 151 U/L (ref 0–39)
ATYPICAL LYMPHOCYTE ABSOLUTE COUNT: 0.35 K/UL (ref 0–0.46)
ATYPICAL LYMPHOCYTES: 2 % (ref 0–4)
BASOPHILS # BLD: 0 K/UL (ref 0–0.2)
BASOPHILS NFR BLD: 0 % (ref 0–2)
BILIRUB SERPL-MCNC: 0.6 MG/DL (ref 0–1.2)
BUN SERPL-MCNC: 34 MG/DL (ref 6–23)
CALCIUM SERPL-MCNC: 8.1 MG/DL (ref 8.6–10.2)
CHLORIDE SERPL-SCNC: 98 MMOL/L (ref 98–107)
CO2 SERPL-SCNC: 25 MMOL/L (ref 22–29)
CREAT SERPL-MCNC: 2.3 MG/DL (ref 0.7–1.2)
EOSINOPHIL # BLD: 0 K/UL (ref 0.05–0.5)
EOSINOPHILS RELATIVE PERCENT: 0 % (ref 0–6)
ERYTHROCYTE [DISTWIDTH] IN BLOOD BY AUTOMATED COUNT: 18.3 % (ref 11.5–15)
GFR, ESTIMATED: 32 ML/MIN/1.73M2
GLUCOSE SERPL-MCNC: 174 MG/DL (ref 74–99)
HCT VFR BLD AUTO: 43.2 % (ref 37–54)
HGB BLD-MCNC: 13.7 G/DL (ref 12.5–16.5)
LYMPHOCYTES NFR BLD: 13.98 K/UL (ref 1.5–4)
LYMPHOCYTES RELATIVE PERCENT: 79 % (ref 20–42)
MAGNESIUM SERPL-MCNC: 1.8 MG/DL (ref 1.6–2.6)
MCH RBC QN AUTO: 30.6 PG (ref 26–35)
MCHC RBC AUTO-ENTMCNC: 31.7 G/DL (ref 32–34.5)
MCV RBC AUTO: 96.4 FL (ref 80–99.9)
MONOCYTES NFR BLD: 0.35 K/UL (ref 0.1–0.95)
MONOCYTES NFR BLD: 2 % (ref 2–12)
NEUTROPHILS NFR BLD: 17 % (ref 43–80)
NEUTS SEG NFR BLD: 3.01 K/UL (ref 1.8–7.3)
PHOSPHATE SERPL-MCNC: 3.8 MG/DL (ref 2.5–4.5)
PLATELET # BLD AUTO: 200 K/UL (ref 130–450)
PMV BLD AUTO: 10.6 FL (ref 7–12)
POTASSIUM SERPL-SCNC: 4.5 MMOL/L (ref 3.5–5)
PROT SERPL-MCNC: 5.7 G/DL (ref 6.4–8.3)
RBC # BLD AUTO: 4.48 M/UL (ref 3.8–5.8)
RBC # BLD: ABNORMAL 10*6/UL
SODIUM SERPL-SCNC: 135 MMOL/L (ref 132–146)
WBC # BLD: ABNORMAL 10*3/UL
WBC OTHER # BLD: 17.7 K/UL (ref 4.5–11.5)

## 2024-06-30 PROCEDURE — 6370000000 HC RX 637 (ALT 250 FOR IP)

## 2024-06-30 PROCEDURE — 6360000002 HC RX W HCPCS: Performed by: INTERNAL MEDICINE

## 2024-06-30 PROCEDURE — 36415 COLL VENOUS BLD VENIPUNCTURE: CPT

## 2024-06-30 PROCEDURE — 2060000000 HC ICU INTERMEDIATE R&B

## 2024-06-30 PROCEDURE — 2700000000 HC OXYGEN THERAPY PER DAY

## 2024-06-30 PROCEDURE — 84100 ASSAY OF PHOSPHORUS: CPT

## 2024-06-30 PROCEDURE — 83735 ASSAY OF MAGNESIUM: CPT

## 2024-06-30 PROCEDURE — 94668 MNPJ CHEST WALL SBSQ: CPT

## 2024-06-30 PROCEDURE — 85025 COMPLETE CBC W/AUTO DIFF WBC: CPT

## 2024-06-30 PROCEDURE — 2580000003 HC RX 258: Performed by: INTERNAL MEDICINE

## 2024-06-30 PROCEDURE — 6360000002 HC RX W HCPCS: Performed by: CLINICAL NURSE SPECIALIST

## 2024-06-30 PROCEDURE — 80053 COMPREHEN METABOLIC PANEL: CPT

## 2024-06-30 PROCEDURE — 6370000000 HC RX 637 (ALT 250 FOR IP): Performed by: STUDENT IN AN ORGANIZED HEALTH CARE EDUCATION/TRAINING PROGRAM

## 2024-06-30 PROCEDURE — 6370000000 HC RX 637 (ALT 250 FOR IP): Performed by: INTERNAL MEDICINE

## 2024-06-30 PROCEDURE — 6360000002 HC RX W HCPCS: Performed by: STUDENT IN AN ORGANIZED HEALTH CARE EDUCATION/TRAINING PROGRAM

## 2024-06-30 PROCEDURE — 2580000003 HC RX 258: Performed by: STUDENT IN AN ORGANIZED HEALTH CARE EDUCATION/TRAINING PROGRAM

## 2024-06-30 PROCEDURE — 6370000000 HC RX 637 (ALT 250 FOR IP): Performed by: CLINICAL NURSE SPECIALIST

## 2024-06-30 PROCEDURE — 6370000000 HC RX 637 (ALT 250 FOR IP): Performed by: NURSE PRACTITIONER

## 2024-06-30 PROCEDURE — 94640 AIRWAY INHALATION TREATMENT: CPT

## 2024-06-30 PROCEDURE — 71045 X-RAY EXAM CHEST 1 VIEW: CPT

## 2024-06-30 RX ORDER — ALBUTEROL SULFATE 2.5 MG/3ML
2.5 SOLUTION RESPIRATORY (INHALATION) 4 TIMES DAILY PRN
Status: DISCONTINUED | OUTPATIENT
Start: 2024-06-30 | End: 2024-07-03

## 2024-06-30 RX ORDER — GUAIFENESIN 400 MG/1
400 TABLET ORAL DAILY
Status: DISCONTINUED | OUTPATIENT
Start: 2024-07-01 | End: 2024-07-04 | Stop reason: HOSPADM

## 2024-06-30 RX ADMIN — DOXYCYCLINE HYCLATE 100 MG: 100 CAPSULE ORAL at 08:15

## 2024-06-30 RX ADMIN — PIPERACILLIN AND TAZOBACTAM 3375 MG: 3; .375 INJECTION, POWDER, LYOPHILIZED, FOR SOLUTION INTRAVENOUS at 07:00

## 2024-06-30 RX ADMIN — SODIUM BICARBONATE 650 MG: 650 TABLET ORAL at 08:15

## 2024-06-30 RX ADMIN — ZOLPIDEM TARTRATE 5 MG: 5 TABLET ORAL at 21:15

## 2024-06-30 RX ADMIN — PIPERACILLIN AND TAZOBACTAM 3375 MG: 3; .375 INJECTION, POWDER, LYOPHILIZED, FOR SOLUTION INTRAVENOUS at 23:11

## 2024-06-30 RX ADMIN — HYDROCODONE BITARTRATE AND HOMATROPINE METHYLBROMIDE 5 ML: 5; 1.5 SOLUTION ORAL at 06:29

## 2024-06-30 RX ADMIN — ALBUTEROL SULFATE 2.5 MG: 2.5 SOLUTION RESPIRATORY (INHALATION) at 08:55

## 2024-06-30 RX ADMIN — ACETAMINOPHEN 650 MG: 325 TABLET ORAL at 21:15

## 2024-06-30 RX ADMIN — DOXYCYCLINE HYCLATE 100 MG: 100 CAPSULE ORAL at 20:57

## 2024-06-30 RX ADMIN — INSULIN LISPRO 6 UNITS: 100 INJECTION, SOLUTION INTRAVENOUS; SUBCUTANEOUS at 17:18

## 2024-06-30 RX ADMIN — ALBUTEROL SULFATE 2.5 MG: 2.5 SOLUTION RESPIRATORY (INHALATION) at 13:08

## 2024-06-30 RX ADMIN — Medication 6 MG: at 21:14

## 2024-06-30 RX ADMIN — SODIUM CHLORIDE, PRESERVATIVE FREE 10 ML: 5 INJECTION INTRAVENOUS at 20:57

## 2024-06-30 RX ADMIN — SODIUM CHLORIDE, PRESERVATIVE FREE 10 ML: 5 INJECTION INTRAVENOUS at 08:16

## 2024-06-30 RX ADMIN — PIPERACILLIN AND TAZOBACTAM 3375 MG: 3; .375 INJECTION, POWDER, LYOPHILIZED, FOR SOLUTION INTRAVENOUS at 16:13

## 2024-06-30 RX ADMIN — AZITHROMYCIN MONOHYDRATE 500 MG: 500 INJECTION, POWDER, LYOPHILIZED, FOR SOLUTION INTRAVENOUS at 13:50

## 2024-06-30 ASSESSMENT — PAIN SCALES - GENERAL: PAINLEVEL_OUTOF10: 0

## 2024-07-01 LAB
ALBUMIN SERPL-MCNC: 2.9 G/DL (ref 3.5–5.2)
ALP SERPL-CCNC: 108 U/L (ref 40–129)
ALT SERPL-CCNC: 69 U/L (ref 0–40)
ANION GAP SERPL CALCULATED.3IONS-SCNC: 14 MMOL/L (ref 7–16)
AST SERPL-CCNC: 89 U/L (ref 0–39)
BASOPHILS # BLD: 0 K/UL (ref 0–0.2)
BASOPHILS NFR BLD: 0 % (ref 0–2)
BILIRUB SERPL-MCNC: 0.5 MG/DL (ref 0–1.2)
BUN SERPL-MCNC: 41 MG/DL (ref 6–23)
CALCIUM SERPL-MCNC: 8.2 MG/DL (ref 8.6–10.2)
CHLORIDE SERPL-SCNC: 100 MMOL/L (ref 98–107)
CO2 SERPL-SCNC: 21 MMOL/L (ref 22–29)
CREAT SERPL-MCNC: 2.4 MG/DL (ref 0.7–1.2)
CREAT UR-MCNC: 121 MG/DL (ref 40–278)
CRP SERPL HS-MCNC: 35 MG/L (ref 0–5)
EOSINOPHIL # BLD: 0 K/UL (ref 0.05–0.5)
EOSINOPHILS RELATIVE PERCENT: 0 % (ref 0–6)
ERYTHROCYTE [DISTWIDTH] IN BLOOD BY AUTOMATED COUNT: 18.2 % (ref 11.5–15)
ERYTHROCYTE [SEDIMENTATION RATE] IN BLOOD BY WESTERGREN METHOD: 21 MM/HR (ref 0–15)
GFR, ESTIMATED: 31 ML/MIN/1.73M2
GLUCOSE SERPL-MCNC: 168 MG/DL (ref 74–99)
HAV IGM SERPL QL IA: NONREACTIVE
HBV CORE IGM SERPL QL IA: NONREACTIVE
HBV SURFACE AG SERPL QL IA: NONREACTIVE
HCT VFR BLD AUTO: 41.9 % (ref 37–54)
HCV AB SERPL QL IA: NONREACTIVE
HGB BLD-MCNC: 13.7 G/DL (ref 12.5–16.5)
HSV I/II AB, IGM: 3.29 IV
HSV I/II IGG: >22.4 IV
HSV1 GG IGG SER-ACNC: 14.2 IV
HSV2 GG IGG SER-ACNC: 10.7 IV
LYMPHOCYTES NFR BLD: 12.32 K/UL (ref 1.5–4)
LYMPHOCYTES RELATIVE PERCENT: 70 % (ref 20–42)
MAGNESIUM SERPL-MCNC: 1.9 MG/DL (ref 1.6–2.6)
MCH RBC QN AUTO: 31.3 PG (ref 26–35)
MCHC RBC AUTO-ENTMCNC: 32.7 G/DL (ref 32–34.5)
MCV RBC AUTO: 95.7 FL (ref 80–99.9)
MITOCHONDRIA AB SER QL: NEGATIVE
MONOCYTES NFR BLD: 13 % (ref 2–12)
MONOCYTES NFR BLD: 2.29 K/UL (ref 0.1–0.95)
NEUTROPHILS NFR BLD: 17 % (ref 43–80)
NEUTS SEG NFR BLD: 2.99 K/UL (ref 1.8–7.3)
NUCLEATED RED BLOOD CELLS: 1 PER 100 WBC
PATH REV BLD -IMP: NORMAL
PHOSPHATE SERPL-MCNC: 3.8 MG/DL (ref 2.5–4.5)
PLATELET # BLD AUTO: 167 K/UL (ref 130–450)
PMV BLD AUTO: 10.7 FL (ref 7–12)
POTASSIUM SERPL-SCNC: 4.6 MMOL/L (ref 3.5–5)
PROCALCITONIN SERPL-MCNC: 0.55 NG/ML (ref 0–0.08)
PROT SERPL-MCNC: 5.7 G/DL (ref 6.4–8.3)
RBC # BLD AUTO: 4.38 M/UL (ref 3.8–5.8)
RBC # BLD: ABNORMAL 10*6/UL
RBC # BLD: ABNORMAL 10*6/UL
SODIUM SERPL-SCNC: 135 MMOL/L (ref 132–146)
TOTAL PROTEIN, URINE: 9 MG/DL (ref 0–12)
WBC # BLD: ABNORMAL 10*3/UL
WBC OTHER # BLD: 17.6 K/UL (ref 4.5–11.5)

## 2024-07-01 PROCEDURE — 82784 ASSAY IGA/IGD/IGG/IGM EACH: CPT

## 2024-07-01 PROCEDURE — 5A09357 ASSISTANCE WITH RESPIRATORY VENTILATION, LESS THAN 24 CONSECUTIVE HOURS, CONTINUOUS POSITIVE AIRWAY PRESSURE: ICD-10-PCS | Performed by: STUDENT IN AN ORGANIZED HEALTH CARE EDUCATION/TRAINING PROGRAM

## 2024-07-01 PROCEDURE — 83516 IMMUNOASSAY NONANTIBODY: CPT

## 2024-07-01 PROCEDURE — 2580000003 HC RX 258: Performed by: STUDENT IN AN ORGANIZED HEALTH CARE EDUCATION/TRAINING PROGRAM

## 2024-07-01 PROCEDURE — 83735 ASSAY OF MAGNESIUM: CPT

## 2024-07-01 PROCEDURE — 6360000002 HC RX W HCPCS: Performed by: INTERNAL MEDICINE

## 2024-07-01 PROCEDURE — 94660 CPAP INITIATION&MGMT: CPT

## 2024-07-01 PROCEDURE — 6370000000 HC RX 637 (ALT 250 FOR IP): Performed by: INTERNAL MEDICINE

## 2024-07-01 PROCEDURE — 85025 COMPLETE CBC W/AUTO DIFF WBC: CPT

## 2024-07-01 PROCEDURE — 2700000000 HC OXYGEN THERAPY PER DAY

## 2024-07-01 PROCEDURE — 84145 PROCALCITONIN (PCT): CPT

## 2024-07-01 PROCEDURE — 6360000002 HC RX W HCPCS

## 2024-07-01 PROCEDURE — 2580000003 HC RX 258

## 2024-07-01 PROCEDURE — 80053 COMPREHEN METABOLIC PANEL: CPT

## 2024-07-01 PROCEDURE — 6360000002 HC RX W HCPCS: Performed by: STUDENT IN AN ORGANIZED HEALTH CARE EDUCATION/TRAINING PROGRAM

## 2024-07-01 PROCEDURE — 2060000000 HC ICU INTERMEDIATE R&B

## 2024-07-01 PROCEDURE — 86665 EPSTEIN-BARR CAPSID VCA: CPT

## 2024-07-01 PROCEDURE — 85652 RBC SED RATE AUTOMATED: CPT

## 2024-07-01 PROCEDURE — 86140 C-REACTIVE PROTEIN: CPT

## 2024-07-01 PROCEDURE — 84156 ASSAY OF PROTEIN URINE: CPT

## 2024-07-01 PROCEDURE — 86036 ANCA SCREEN EACH ANTIBODY: CPT

## 2024-07-01 PROCEDURE — 6370000000 HC RX 637 (ALT 250 FOR IP): Performed by: STUDENT IN AN ORGANIZED HEALTH CARE EDUCATION/TRAINING PROGRAM

## 2024-07-01 PROCEDURE — 36415 COLL VENOUS BLD VENIPUNCTURE: CPT

## 2024-07-01 PROCEDURE — 82570 ASSAY OF URINE CREATININE: CPT

## 2024-07-01 PROCEDURE — 84100 ASSAY OF PHOSPHORUS: CPT

## 2024-07-01 PROCEDURE — 2580000003 HC RX 258: Performed by: INTERNAL MEDICINE

## 2024-07-01 RX ORDER — PANTOPRAZOLE SODIUM 40 MG/1
40 TABLET, DELAYED RELEASE ORAL
Status: DISCONTINUED | OUTPATIENT
Start: 2024-07-02 | End: 2024-07-04 | Stop reason: HOSPADM

## 2024-07-01 RX ADMIN — DOXYCYCLINE HYCLATE 100 MG: 100 CAPSULE ORAL at 09:05

## 2024-07-01 RX ADMIN — AZITHROMYCIN MONOHYDRATE 500 MG: 500 INJECTION, POWDER, LYOPHILIZED, FOR SOLUTION INTRAVENOUS at 14:18

## 2024-07-01 RX ADMIN — PIPERACILLIN AND TAZOBACTAM 3375 MG: 3; .375 INJECTION, POWDER, LYOPHILIZED, FOR SOLUTION INTRAVENOUS at 22:58

## 2024-07-01 RX ADMIN — PIPERACILLIN AND TAZOBACTAM 3375 MG: 3; .375 INJECTION, POWDER, LYOPHILIZED, FOR SOLUTION INTRAVENOUS at 16:14

## 2024-07-01 RX ADMIN — ACETAMINOPHEN 650 MG: 325 TABLET ORAL at 06:36

## 2024-07-01 RX ADMIN — SODIUM CHLORIDE, PRESERVATIVE FREE 10 ML: 5 INJECTION INTRAVENOUS at 09:05

## 2024-07-01 RX ADMIN — BUMETANIDE 0.5 MG/HR: 0.25 INJECTION INTRAMUSCULAR; INTRAVENOUS at 02:38

## 2024-07-01 RX ADMIN — DOXYCYCLINE HYCLATE 100 MG: 100 CAPSULE ORAL at 20:55

## 2024-07-01 RX ADMIN — ZOLPIDEM TARTRATE 5 MG: 5 TABLET ORAL at 20:55

## 2024-07-01 RX ADMIN — ACETAMINOPHEN 650 MG: 325 TABLET ORAL at 20:55

## 2024-07-01 RX ADMIN — PIPERACILLIN AND TAZOBACTAM 3375 MG: 3; .375 INJECTION, POWDER, LYOPHILIZED, FOR SOLUTION INTRAVENOUS at 06:49

## 2024-07-01 ASSESSMENT — PAIN DESCRIPTION - DESCRIPTORS: DESCRIPTORS: ACHING;DISCOMFORT

## 2024-07-01 ASSESSMENT — PAIN SCALES - GENERAL
PAINLEVEL_OUTOF10: 3
PAINLEVEL_OUTOF10: 0

## 2024-07-01 ASSESSMENT — PAIN - FUNCTIONAL ASSESSMENT: PAIN_FUNCTIONAL_ASSESSMENT: ACTIVITIES ARE NOT PREVENTED

## 2024-07-01 ASSESSMENT — PAIN DESCRIPTION - LOCATION: LOCATION: HEAD

## 2024-07-01 NOTE — CARE COORDINATION
Here for Shortness of Breath Patient sent by PCP for CHF and bilateral leg swelling Oncology consult- leucocytosis of unclear etiology, possible cryglobulinemia ? Wbc today 16=7.6 General surgery consult-  ncidentally noted porcelain gb on imaging; asymptomaticHe is scheduled for outpatient cholecystectomy July 18- surgery signed off. Per GI-EGD/colonoscopy as outpatient, currently scheduled for 7/10/24  Pulmonology and ID and nephrology following .bumex drip. Per ID - Zosyn to continue Add doxycycline and azithromycin. Currently on 4 liters oxygen if unable to wean will need amb pulse ox. Per prior sw note- provided pt with dme list in case he requires home o2  cm/sw to follow.Electronically signed by Caridad Jacob RN on 7/1/2024 at 3:04 PM

## 2024-07-01 NOTE — CONSULTS
Paul Burns M.D.,[facility]  Leonel Hernandez D.O., AMARI., [facility]  Karina Llanos M.D.  Chio Jarquin M.D.   Paulo Honeycutt D.O.      Patient:  David Moyer 58 y.o. male MRN: [id number]           PULMONARY CONSULTATION    Reason for Consultation: Worsening dyspnea, possible R sided pna, previously see by jocelyne  Referring Physician: Tanner Van MD    Communication with the referring physician will be sent via the electronic medical record.    Chief Complaint: shortness of breath    CODE STATUS: FULL CODE    SUBJECTIVE:  HPI:  David Moyer is a 58 y.o. male not previously known to our group we were asked to see for worsening dyspnea.    David was sent to the ED by his PCP for leg swelling indicating possible CHF. He reported not feeling well for the past several weeks with bodyaches, shortness of breath, weakness, fevers, low urine output and leg swelling.  Work up showed RUFINA and Nephrology was consulted and has been following, currently giving him IV diuretics. 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  Chest CT shows some atelectasis in right upper and lower lobes along with a calcified granuloma UMESH. No effusions or masses noted. He has been mildly tachycardic and hypoxic requiring supplemental oxygen. ProBNP 80, WBC 12.6, procalcitoin 0.59. respiratory panel and strep and legionella urine antigens were negative. VQ scan showed low probability of PE.    Arcenio is known to Dr. Jack of [facility] for the elevated right diaphragm.  He does snore, but not terribly so. He was never tested for ADRIANA.      No past medical history on file.    No past surgical history on file.    No family history on file.    Social History:   Social History     Socioeconomic History    Marital status:      Spouse name: Not on file    Number of children: Not on file    Years of education: Not on file    Highest education level: 
Advanced Endoscopy/Gastroenterology Consult Note  Gagan Trejo D.O.    Requesting physician:Dr. Van  Reason for Consult: Elevated liver chemistries  Date:12:03 PM 6/28/2024    David Moyer  58 y.o.  male    HPI:  Mr Moyer is a pleasant 58 year old male who was sent into ER by PCP for possible CHF exacerbation/bilateral leg swelling which he had noted been getting progressively worse over the past 1-2wks. He also admitted to increased nausea and decreased satiety with nearly any food. He admits to increased fatigue and some sleep disturbances since he returned from [place]. He has had generalized body aches and abdominal pain which . He was started on zetia a few weeks ago. In the ER he was found to have RUFINA, Hyponatremia, elevated LFTs, and leukocytosis. CT abdomen and pelvis shows subsegmental consolidation of right lung base with air bronchogram and findings of a porcelain gallbladder. Abx were initiated but have since been altered. He is known to me in the outpatient office is scheduled for a colonoscopy 7/10. GS, cardiology, nephrology teams are providing consult. Surgical team with plans for cholecystectomy     PAST MEDICAL Hx:  No past medical history on file.    PAST SURGICAL Hx:   No past surgical history on file.    FAMILY Hx:  No family history on file.    HOME MEDICATIONS:  Prior to Admission medications    Medication Sig Start Date End Date Taking? Authorizing Provider   Insulin Degludec (TRESIBA FLEXTOUCH) 200 UNIT/ML SOPN Inject 100 Units into the skin daily   Yes Cammy Mcmanus MD   fenofibric acid (TRILIPIX) 135 MG CPDR capsule Take 1 capsule by mouth daily   Yes Cammy Mcmanus MD   Icosapent Ethyl (VASCEPA) 1 g CAPS capsule Take 2 capsules by mouth daily   Yes Cammy Mcmanus MD   Multiple Vitamins-Minerals (THERAPEUTIC MULTIVITAMIN-MINERALS) tablet Take 1 tablet by mouth daily   Yes Cammy Mcmanus MD   polycarbophil (FIBERCON) 625 MG tablet Take 1,350 mg 
[facility]  [facility]    Consultation Note     Admit Date: 6/25/2024  9:09 AM    Reason for Consult:   Worsening leukocytosis    Attending Physician:  Tanner Van MD     Chief Complaint: Leg swelling and shortness of breath    HISTORY OF PRESENT ILLNESS:   58-year-old male with no documented past medical history presented with leg swelling for last 2 months.  He complains of generalized bodyaches and abdominal pain.  Transaminitis noted with leukocytosis.  Chest x-ray is consistent with atelectasis, no clear evidence of pneumonia.  Patient was started on Zosyn for worsening leukocytosis.  Patient is afebrile.  ID consulted for worsening leukocytosis.  Past Medical History:    No past medical history on file.  Past Surgical History:    No past surgical history on file.  Current Medications:   Scheduled Meds:   piperacillin-tazobactam  3,375 mg IntraVENous Q8H    ipratropium 0.5 mg-albuterol 2.5 mg  1 Dose Inhalation Q4H WA RT    calcium carbonate  500 mg Oral Once    sodium bicarbonate  650 mg Oral BID    sodium chloride flush  10 mL IntraVENous 2 times per day    enoxaparin  30 mg SubCUTAneous BID    [Held by provider] aspirin  81 mg Oral Daily    insulin lispro  0-8 Units SubCUTAneous TID WC    insulin lispro  0-4 Units SubCUTAneous Nightly     Continuous Infusions:   bumetanide (BUMEX) 12.5 mg in sodium chloride 0.9 % 125 mL infusion 0.5 mg/hr (06/28/24 1503)    sodium chloride      dextrose       PRN Meds:HYDROcodone homatropine, prochlorperazine, zolpidem, sodium chloride flush, sodium chloride, potassium chloride **OR** potassium alternative oral replacement **OR** potassium chloride, ondansetron **OR** ondansetron, senna, acetaminophen **OR** acetaminophen, albuterol, glucose, dextrose bolus **OR** dextrose bolus, glucagon (rDNA), dextrose, hydrOXYzine, melatonin    Allergies:  Patient has no known allergies.    Social History:   Social History     Socioeconomic History    
Not on file   Food Insecurity: No Food Insecurity (6/25/2024)    Hunger Vital Sign     Worried About Running Out of Food in the Last Year: Never true     Ran Out of Food in the Last Year: Never true   Transportation Needs: No Transportation Needs (6/25/2024)    PRAPARE - Transportation     Lack of Transportation (Medical): No     Lack of Transportation (Non-Medical): No   Physical Activity: Not on file   Stress: Not on file   Social Connections: Not on file   Intimate Partner Violence: Not on file   Housing Stability: Low Risk  (6/25/2024)    Housing Stability Vital Sign     Unable to Pay for Housing in the Last Year: No     Number of Places Lived in the Last Year: 1     Unstable Housing in the Last Year: No         Recent Labs     06/25/24  0930 06/26/24  0555   WBC 12.6* 11.6*   HGB 12.4* 13.1   HCT 38.4 39.5   MCV 92.5 95.4    211       Recent Labs     06/25/24  0930 06/26/24  0555   * 133   K 4.8 5.5*   CO2 22 19*   PHOS  --  2.7   BUN 37* 37*   CREATININE 2.0* 2.1*       Recent Labs     06/25/24  0930 06/26/24  0555   ALKPHOS 74 80   * 117*   * 125*   BILITOT 0.5 0.5   LIPASE 21  --        No intake or output data in the 24 hours ending 06/26/24 1619    Review of Systems   Constitutional:  Negative for fever and unexpected weight change.   HENT:  Negative for hearing loss.    Eyes:  Negative for visual disturbance.   Respiratory:  Positive for shortness of breath. Negative for cough.    Cardiovascular:  Negative for chest pain and leg swelling.   Gastrointestinal:  Positive for nausea and vomiting. Negative for abdominal distention, abdominal pain, blood in stool, constipation, diarrhea and rectal pain.   Endocrine: Negative.    Genitourinary:  Negative for difficulty urinating, dysuria and hematuria.   Musculoskeletal:  Negative for arthralgias and gait problem.   Skin:  Negative for rash and wound.   Allergic/Immunologic: Negative.    Neurological:  Negative for weakness and 
*Per Sliding Scale*   Yes Provider, MD Cammy   valACYclovir (VALTREX) 500 MG tablet Take 1 tablet by mouth daily as needed   Yes Provider, MD Cammy   Glucosamine-Chondroitin (OSTEO BI-FLEX REGULAR STRENGTH PO) Take 2 tablets by mouth daily   Yes Provider, MD Cammy   Finerenone (KERENDIA) 20 MG TABS Take 1 tablet by mouth daily   Yes Provider, MD Cammy   sildenafil (VIAGRA) 50 MG tablet Take 1 tablet by mouth daily as needed for Erectile Dysfunction   Yes Provider, MD Cammy   aspirin 81 MG chewable tablet Take 1 tablet by mouth daily    Provider, MD Cammy   atorvastatin (LIPITOR) 40 MG tablet Take 1 tablet by mouth daily    Provider, MD Cammy   valsartan (DIOVAN) 320 MG tablet Take 1 tablet by mouth daily    Provider, MD Cammy   dapagliflozin (FARXIGA) 10 MG tablet Take 1 tablet by mouth every morning    Provider, MD Cammy   albuterol sulfate HFA (VENTOLIN HFA) 108 (90 Base) MCG/ACT inhaler Inhale 2 puffs into the lungs 4 times daily as needed for Wheezing 7/6/22   Higinio Walden,        Current Medications:    Current Facility-Administered Medications: sodium zirconium cyclosilicate (LOKELMA) oral suspension 10 g, 10 g, Oral, Once  piperacillin-tazobactam (ZOSYN) 3,375 mg in sodium chloride 0.9 % 50 mL IVPB ([id number]), 3,375 mg, IntraVENous, Q8H  perflutren lipid microspheres (DEFINITY) injection 1.5 mL, 1.5 mL, IntraVENous, ONCE PRN  ipratropium 0.5 mg-albuterol 2.5 mg (DUONEB) nebulizer solution 1 Dose, 1 Dose, Inhalation, Q4H WA RT  zolpidem (AMBIEN) tablet 5 mg, 5 mg, Oral, Nightly PRN  calcium carbonate (TUMS) chewable tablet 500 mg, 500 mg, Oral, Once  sodium bicarbonate tablet 650 mg, 650 mg, Oral, BID  bumetanide (BUMEX) injection 1 mg, 1 mg, IntraVENous, BID  sodium chloride flush 0.9 % injection 10 mL, 10 mL, IntraVENous, 2 times per day  sodium chloride flush 0.9 % injection 10 mL, 10 mL, IntraVENous, PRN  0.9 % sodium chloride infusion, , 
-- -- 94 %   06/25/24 1143 (!) 104/57 -- 95 25 93 %   06/25/24 1100 (!) 108/50 -- 95 22 93 %   06/25/24 1057 (!) 120/50 -- 98 25 93 %   06/25/24 1008 -- -- 96 25 94 %   06/25/24 1001 (!) 113/58 -- 96 23 91 %   06/25/24 0919 (!) 121/59 -- (!) 102 28 92 %   06/25/24 0914 123/62 98.1 °F (36.7 °C) (!) 108 18 94 %       No intake or output data in the 24 hours ending 06/25/24 1504    General: Awake, alert, no acute distress  Neck: No JVD noted  Lungs: Clear bilaterally upper, diminished to the bases bilaterally.  Unlabored  CV: Regular rate and rhythm.  No rub  Abd: Soft, nontender, nondistended.  Active bowel sounds  Skin: Warm and dry.  No rash on exposed extremities  Ext: 1+ BLE edema   Neuro: Awake, answers questions appropriately    Data:    Recent Labs     06/25/24  0930   WBC 12.6*   HGB 12.4*   HCT 38.4   MCV 92.5          Recent Labs     06/25/24  0930   *   K 4.8      CO2 22   CREATININE 2.0*   BUN 37*   LABGLOM 37*   GLUCOSE 147*   CALCIUM 8.0*   MG 2.1       No results found for: \"VITD25\"    No results found for: \"PTH\"    Recent Labs     06/25/24  0930   *   *   ALKPHOS 74   BILITOT 0.5       No results for input(s): \"LABALBU\" in the last 72 hours.    No results found for: \"FERRITIN\", \"IRON\", \"TIBC\"    No results found for: \"SDBCERKS08\"    No results found for: \"FOLATE\"    No results found for: \"VOL\", \"APPEARANCE\", \"COLORU\", \"LABSPEC\", \"LABPH\", \"LEUKBLD\", \"NITRU\", \"GLUCOSEU\", \"KETUA\", \"UROBILINOGEN\", \"BILIRUBINUR\", \"OCBU\"    Lab Results   Component Value Date/Time    PROTEIN 5.5 (L) 06/25/2024 09:30 AM       No components found for: \"URIC\"    No results found for: \"LIPIDPAN\"    Assessment and Plans:    RUFINA stage I on a likely underlying CKD 3 A  RUFINA in setting of decreased effective renal perfusion secondary to poor oral intake and likely exacerbated by outpatient ARB  Baseline creatinine 1.2-1.5  Creatinine peaked at 2 on 6/25  CT abdomen 6/25-mild-moderate bilateral 
167 07/01/2024    LYMPHOPCT 70 (H) 07/01/2024    RBC 4.38 07/01/2024    MCH 31.3 07/01/2024    MCHC 32.7 07/01/2024    RDW 18.2 (H) 07/01/2024    NEUTOPHILPCT 17 (L) 07/01/2024    MONOPCT 13 (H) 07/01/2024    EOSPCT 0 07/01/2024    BASOPCT 0 07/01/2024    NEUTROABS 2.99 07/01/2024    LYMPHSABS 12.32 (H) 07/01/2024    MONOSABS 2.29 (H) 07/01/2024    EOSABS 0.00 (L) 07/01/2024    BASOSABS 0.00 07/01/2024       Lab Results   Component Value Date     07/01/2024    K 4.6 07/01/2024     07/01/2024    CO2 21 (L) 07/01/2024    BUN 41 (H) 07/01/2024    CREATININE 2.4 (H) 07/01/2024    GLUCOSE 168 (H) 07/01/2024    CALCIUM 8.2 (L) 07/01/2024    BILITOT 0.5 07/01/2024    ALKPHOS 108 07/01/2024    AST 89 (H) 07/01/2024    ALT 69 (H) 07/01/2024    LABGLOM 31 (L) 07/01/2024    GFRAA >60 07/06/2022       Lab Results   Component Value Date    IRON 96 06/29/2024    TIBC 163 (L) 06/29/2024    FERRITIN 6,048 06/29/2024           Radiology-    XR CHEST PORTABLE    Result Date: 6/30/2024  EXAMINATION: ONE XRAY VIEW OF THE CHEST 6/30/2024 9:14 am COMPARISON: 06/28/2024 HISTORY: ORDERING SYSTEM PROVIDED HISTORY: dyspnea TECHNOLOGIST PROVIDED HISTORY: Reason for exam:->dyspnea FINDINGS: There is slightly increased linear density right lung base that may represent atelectasis.  No significant effusions or consolidation are identified.  The pulmonary vasculature and heart size appears within the normal range otherwise.     There is minimal right basilar atelectasis.     Echo (TTE) complete (PRN contrast/bubble/strain/3D)    Result Date: 6/28/2024    Left Ventricle: Normal left ventricular systolic function with a visually estimated EF of 65 - 70%. Left ventricle size is normal. Mildly increased wall thickness. Normal wall motion.   Right Ventricle: Right ventricle is mildly dilated.   Aortic Valve: Trileaflet valve. No regurgitation. No stenosis.   Mitral Valve: Trace regurgitation.   Tricuspid Valve: Unable to assess RVSP due to

## 2024-07-02 ENCOUNTER — APPOINTMENT (OUTPATIENT)
Dept: CT IMAGING | Age: 59
DRG: 871 | End: 2024-07-02
Payer: COMMERCIAL

## 2024-07-02 LAB
A1AT SERPL-MCNC: 204 MG/DL (ref 90–200)
ALBUMIN SERPL-MCNC: 2.9 G/DL (ref 3.5–5.2)
ALP SERPL-CCNC: 99 U/L (ref 40–129)
ALT SERPL-CCNC: 47 U/L (ref 0–40)
ANION GAP SERPL CALCULATED.3IONS-SCNC: 12 MMOL/L (ref 7–16)
AST SERPL-CCNC: 59 U/L (ref 0–39)
ATYPICAL LYMPHOCYTE ABSOLUTE COUNT: 0.58 K/UL (ref 0–0.46)
ATYPICAL LYMPHOCYTES: 5 % (ref 0–4)
BASOPHILS # BLD: 0 K/UL (ref 0–0.2)
BASOPHILS NFR BLD: 0 % (ref 0–2)
BILIRUB SERPL-MCNC: 0.5 MG/DL (ref 0–1.2)
BUN SERPL-MCNC: 45 MG/DL (ref 6–23)
CALCIUM SERPL-MCNC: 8.2 MG/DL (ref 8.6–10.2)
CERULOPLASMIN SERPL-MCNC: 31 MG/DL (ref 15–30)
CHLORIDE SERPL-SCNC: 101 MMOL/L (ref 98–107)
CO2 SERPL-SCNC: 23 MMOL/L (ref 22–29)
CREAT SERPL-MCNC: 2.1 MG/DL (ref 0.7–1.2)
EOSINOPHIL # BLD: 0.12 K/UL (ref 0.05–0.5)
EOSINOPHILS RELATIVE PERCENT: 1 % (ref 0–6)
ERYTHROCYTE [DISTWIDTH] IN BLOOD BY AUTOMATED COUNT: 18.2 % (ref 11.5–15)
GFR, ESTIMATED: 36 ML/MIN/1.73M2
GLUCOSE SERPL-MCNC: 207 MG/DL (ref 74–99)
HCT VFR BLD AUTO: 42.3 % (ref 37–54)
HGB BLD-MCNC: 13.4 G/DL (ref 12.5–16.5)
IGA SERPL-MCNC: 180 MG/DL (ref 70–400)
IGG SERPL-MCNC: 784 MG/DL (ref 700–1600)
IGM SERPL-MCNC: 238 MG/DL (ref 40–230)
INR PPP: 1.1
LYMPHOCYTES NFR BLD: 7.08 K/UL (ref 1.5–4)
LYMPHOCYTES RELATIVE PERCENT: 61 % (ref 20–42)
MAGNESIUM SERPL-MCNC: 1.7 MG/DL (ref 1.6–2.6)
MCH RBC QN AUTO: 30.5 PG (ref 26–35)
MCHC RBC AUTO-ENTMCNC: 31.7 G/DL (ref 32–34.5)
MCV RBC AUTO: 96.1 FL (ref 80–99.9)
MONOCYTES NFR BLD: 0.46 K/UL (ref 0.1–0.95)
MONOCYTES NFR BLD: 4 % (ref 2–12)
NEUTROPHILS NFR BLD: 29 % (ref 43–80)
NEUTS SEG NFR BLD: 3.36 K/UL (ref 1.8–7.3)
PHOSPHATE SERPL-MCNC: 3.6 MG/DL (ref 2.5–4.5)
PLATELET # BLD AUTO: 159 K/UL (ref 130–450)
PMV BLD AUTO: 10.7 FL (ref 7–12)
POTASSIUM SERPL-SCNC: 4.1 MMOL/L (ref 3.5–5)
PROT SERPL-MCNC: 5.5 G/DL (ref 6.4–8.3)
PROTHROMBIN TIME: 12.2 SEC (ref 9.3–12.4)
RBC # BLD AUTO: 4.4 M/UL (ref 3.8–5.8)
RBC # BLD: ABNORMAL 10*6/UL
SODIUM SERPL-SCNC: 136 MMOL/L (ref 132–146)
WBC # BLD: ABNORMAL 10*3/UL
WBC OTHER # BLD: 11.6 K/UL (ref 4.5–11.5)

## 2024-07-02 PROCEDURE — 6370000000 HC RX 637 (ALT 250 FOR IP): Performed by: INTERNAL MEDICINE

## 2024-07-02 PROCEDURE — 85025 COMPLETE CBC W/AUTO DIFF WBC: CPT

## 2024-07-02 PROCEDURE — 2709999900 CT BIOPSY RENAL

## 2024-07-02 PROCEDURE — 2580000003 HC RX 258: Performed by: INTERNAL MEDICINE

## 2024-07-02 PROCEDURE — 85610 PROTHROMBIN TIME: CPT

## 2024-07-02 PROCEDURE — 6370000000 HC RX 637 (ALT 250 FOR IP): Performed by: NURSE PRACTITIONER

## 2024-07-02 PROCEDURE — 6370000000 HC RX 637 (ALT 250 FOR IP): Performed by: STUDENT IN AN ORGANIZED HEALTH CARE EDUCATION/TRAINING PROGRAM

## 2024-07-02 PROCEDURE — 94660 CPAP INITIATION&MGMT: CPT

## 2024-07-02 PROCEDURE — 80053 COMPREHEN METABOLIC PANEL: CPT

## 2024-07-02 PROCEDURE — 50200 RENAL BIOPSY PERQ: CPT

## 2024-07-02 PROCEDURE — 6360000002 HC RX W HCPCS: Performed by: STUDENT IN AN ORGANIZED HEALTH CARE EDUCATION/TRAINING PROGRAM

## 2024-07-02 PROCEDURE — 6360000002 HC RX W HCPCS: Performed by: RADIOLOGY

## 2024-07-02 PROCEDURE — 6360000002 HC RX W HCPCS: Performed by: INTERNAL MEDICINE

## 2024-07-02 PROCEDURE — 84100 ASSAY OF PHOSPHORUS: CPT

## 2024-07-02 PROCEDURE — 2060000000 HC ICU INTERMEDIATE R&B

## 2024-07-02 PROCEDURE — 36415 COLL VENOUS BLD VENIPUNCTURE: CPT

## 2024-07-02 PROCEDURE — 2580000003 HC RX 258: Performed by: STUDENT IN AN ORGANIZED HEALTH CARE EDUCATION/TRAINING PROGRAM

## 2024-07-02 PROCEDURE — 2700000000 HC OXYGEN THERAPY PER DAY

## 2024-07-02 PROCEDURE — 87529 HSV DNA AMP PROBE: CPT

## 2024-07-02 PROCEDURE — 0TB13ZX EXCISION OF LEFT KIDNEY, PERCUTANEOUS APPROACH, DIAGNOSTIC: ICD-10-PCS | Performed by: RADIOLOGY

## 2024-07-02 PROCEDURE — 83735 ASSAY OF MAGNESIUM: CPT

## 2024-07-02 PROCEDURE — 2500000003 HC RX 250 WO HCPCS: Performed by: RADIOLOGY

## 2024-07-02 RX ORDER — FENTANYL CITRATE 50 UG/ML
INJECTION, SOLUTION INTRAMUSCULAR; INTRAVENOUS PRN
Status: COMPLETED | OUTPATIENT
Start: 2024-07-02 | End: 2024-07-02

## 2024-07-02 RX ORDER — DIPHENHYDRAMINE HYDROCHLORIDE 50 MG/ML
INJECTION INTRAMUSCULAR; INTRAVENOUS PRN
Status: COMPLETED | OUTPATIENT
Start: 2024-07-02 | End: 2024-07-02

## 2024-07-02 RX ORDER — MIDAZOLAM HYDROCHLORIDE 2 MG/2ML
INJECTION, SOLUTION INTRAMUSCULAR; INTRAVENOUS PRN
Status: COMPLETED | OUTPATIENT
Start: 2024-07-02 | End: 2024-07-02

## 2024-07-02 RX ORDER — HYDRALAZINE HYDROCHLORIDE 20 MG/ML
INJECTION INTRAMUSCULAR; INTRAVENOUS PRN
Status: COMPLETED | OUTPATIENT
Start: 2024-07-02 | End: 2024-07-02

## 2024-07-02 RX ADMIN — SODIUM CHLORIDE, PRESERVATIVE FREE 10 ML: 5 INJECTION INTRAVENOUS at 20:24

## 2024-07-02 RX ADMIN — THROMBIN HUMAN 1 KIT: 2000 POWDER, FOR SOLUTION TOPICAL at 13:35

## 2024-07-02 RX ADMIN — DOXYCYCLINE HYCLATE 100 MG: 100 CAPSULE ORAL at 08:24

## 2024-07-02 RX ADMIN — HYDRALAZINE HYDROCHLORIDE 5 MG: 20 INJECTION INTRAMUSCULAR; INTRAVENOUS at 13:24

## 2024-07-02 RX ADMIN — ENOXAPARIN SODIUM 30 MG: 100 INJECTION SUBCUTANEOUS at 20:23

## 2024-07-02 RX ADMIN — PIPERACILLIN AND TAZOBACTAM 3375 MG: 3; .375 INJECTION, POWDER, LYOPHILIZED, FOR SOLUTION INTRAVENOUS at 22:37

## 2024-07-02 RX ADMIN — Medication 6 MG: at 20:23

## 2024-07-02 RX ADMIN — DOXYCYCLINE HYCLATE 100 MG: 100 CAPSULE ORAL at 20:23

## 2024-07-02 RX ADMIN — ACETAMINOPHEN 650 MG: 325 TABLET ORAL at 08:41

## 2024-07-02 RX ADMIN — FENTANYL CITRATE 25 MCG: 50 INJECTION, SOLUTION INTRAMUSCULAR; INTRAVENOUS at 13:23

## 2024-07-02 RX ADMIN — PIPERACILLIN AND TAZOBACTAM 3375 MG: 3; .375 INJECTION, POWDER, LYOPHILIZED, FOR SOLUTION INTRAVENOUS at 14:39

## 2024-07-02 RX ADMIN — DIPHENHYDRAMINE HYDROCHLORIDE 25 MG: 50 INJECTION, SOLUTION INTRAMUSCULAR; INTRAVENOUS at 13:21

## 2024-07-02 RX ADMIN — ACETAMINOPHEN 650 MG: 325 TABLET ORAL at 20:23

## 2024-07-02 RX ADMIN — AZITHROMYCIN MONOHYDRATE 500 MG: 500 INJECTION, POWDER, LYOPHILIZED, FOR SOLUTION INTRAVENOUS at 14:36

## 2024-07-02 RX ADMIN — MIDAZOLAM HYDROCHLORIDE 0.5 MG: 1 INJECTION, SOLUTION INTRAMUSCULAR; INTRAVENOUS at 13:22

## 2024-07-02 RX ADMIN — PIPERACILLIN AND TAZOBACTAM 3375 MG: 3; .375 INJECTION, POWDER, LYOPHILIZED, FOR SOLUTION INTRAVENOUS at 06:32

## 2024-07-02 RX ADMIN — ZOLPIDEM TARTRATE 5 MG: 5 TABLET ORAL at 20:23

## 2024-07-02 ASSESSMENT — PAIN SCALES - GENERAL
PAINLEVEL_OUTOF10: 0
PAINLEVEL_OUTOF10: 3

## 2024-07-02 ASSESSMENT — PAIN DESCRIPTION - DESCRIPTORS: DESCRIPTORS: ACHING;DISCOMFORT

## 2024-07-02 ASSESSMENT — PAIN DESCRIPTION - LOCATION: LOCATION: HEAD

## 2024-07-02 NOTE — PRE SEDATION
Sedation Pre-Procedure Note    Patient Name: David Moyer   YOB: 1965  Room/Bed: 8521/8521-B  Medical Record Number: [id number]  Date: 7/2/2024   Time: 1:37 PM       Indication:  renal insufficiency    Consent: I have discussed with the patient and/or the patient representative the indication, alternatives, and the possible risks and/or complications of the planned procedure and the anesthesia methods. The patient and/or patient representative appear to understand and agree to proceed.    Vital Signs:   Vitals:    07/02/24 1332   BP: 120/73   Pulse: (!) 107   Resp: 26   Temp:    SpO2: 99%       Past Medical History:   has no past medical history on file.    Past Surgical History:   has no past surgical history on file.    Medications:   Scheduled Meds:    pantoprazole  40 mg Oral QAM AC    guaiFENesin  400 mg Oral Daily    doxycycline hyclate  100 mg Oral BID    azithromycin  500 mg IntraVENous Q24H    piperacillin-tazobactam  3,375 mg IntraVENous Q8H    calcium carbonate  500 mg Oral Once    sodium chloride flush  10 mL IntraVENous 2 times per day    enoxaparin  30 mg SubCUTAneous BID    [Held by provider] aspirin  81 mg Oral Daily    insulin lispro  0-8 Units SubCUTAneous TID WC    insulin lispro  0-4 Units SubCUTAneous Nightly     Continuous Infusions:    bumetanide (BUMEX) 12.5 mg in sodium chloride 0.9 % 125 mL infusion 0.5 mg/hr (07/01/24 0238)    sodium chloride      dextrose       PRN Meds: diphenhydrAMINE, midazolam, fentanNYL, hydrALAZINE, Surgiflo with Evithrom Hemostatic Matrix, albuterol, HYDROcodone homatropine, prochlorperazine, zolpidem, sodium chloride flush, sodium chloride, potassium chloride **OR** potassium alternative oral replacement **OR** potassium chloride, ondansetron **OR** ondansetron, senna, acetaminophen **OR** acetaminophen, albuterol, glucose, dextrose bolus **OR** dextrose bolus, glucagon (rDNA), dextrose, hydrOXYzine, melatonin  Home Meds:   Prior to Admission

## 2024-07-02 NOTE — PLAN OF CARE
Problem: Safety - Adult  Goal: Free from fall injury  7/2/2024 1037 by Donna Castro RN  Outcome: Progressing  7/2/2024 0643 by Coty Martin RN  Outcome: Progressing     Problem: Pain  Goal: Verbalizes/displays adequate comfort level or baseline comfort level  7/2/2024 1037 by Donna Castro RN  Outcome: Progressing  7/2/2024 0643 by Coty Martin RN  Outcome: Progressing

## 2024-07-02 NOTE — BRIEF OP NOTE
Brief Postoperative Note      Patient: David Moyer  YOB: 1965  MRN: [id number]    Date of Procedure: 7/2/2024    Renal insufficiency    Post-Op Diagnosis: Same       Renal biopsy  * No surgeons listed *    Assistant:  * No surgical staff found *    Anesthesia: * Moderate sedation    Estimated Blood Loss (mL): Minimal    Complications: None    Specimens:   Renal     Implants:  * No implants in log *      Drains: * No LDAs found *    Findings:  Infection Present At Time Of Surgery (PATOS) (choose all levels that have infection present):  No infection present  Other Findings: 18 G cores    Electronically signed by HELEN Mcginnis MD on 7/2/2024 at 1:38 PM

## 2024-07-02 NOTE — CARE COORDINATION
Here for SOB Bilateral lower extremity swelling. Continues on bumex drip IV zosyn and  Zithromax. Bun 45 creat 2.1 Pulmonology and  hematology following. to have  renal biopsy in IR today .Discharge plan is home will follow Electronically signed by Caridad Jacob RN on 7/2/2024 at 2:00 PM

## 2024-07-03 LAB
ALBUMIN SERPL-MCNC: 2.9 G/DL (ref 3.5–5.2)
ALP SERPL-CCNC: 85 U/L (ref 40–129)
ALT SERPL-CCNC: 37 U/L (ref 0–40)
ANION GAP SERPL CALCULATED.3IONS-SCNC: 10 MMOL/L (ref 7–16)
AST SERPL-CCNC: 53 U/L (ref 0–39)
BASOPHILS # BLD: 0 K/UL (ref 0–0.2)
BASOPHILS NFR BLD: 0 % (ref 0–2)
BILIRUB SERPL-MCNC: 0.5 MG/DL (ref 0–1.2)
BUN SERPL-MCNC: 45 MG/DL (ref 6–23)
CALCIUM SERPL-MCNC: 8.3 MG/DL (ref 8.6–10.2)
CHLORIDE SERPL-SCNC: 101 MMOL/L (ref 98–107)
CO2 SERPL-SCNC: 25 MMOL/L (ref 22–29)
CREAT SERPL-MCNC: 1.9 MG/DL (ref 0.7–1.2)
EOSINOPHIL # BLD: 0 K/UL (ref 0.05–0.5)
EOSINOPHILS RELATIVE PERCENT: 0 % (ref 0–6)
ERYTHROCYTE [DISTWIDTH] IN BLOOD BY AUTOMATED COUNT: 18 % (ref 11.5–15)
GFR, ESTIMATED: 40 ML/MIN/1.73M2
GLUCOSE SERPL-MCNC: 164 MG/DL (ref 74–99)
HCT VFR BLD AUTO: 40.5 % (ref 37–54)
HGB BLD-MCNC: 12.9 G/DL (ref 12.5–16.5)
LYMPHOCYTES NFR BLD: 6.85 K/UL (ref 1.5–4)
LYMPHOCYTES RELATIVE PERCENT: 64 % (ref 20–42)
MCH RBC QN AUTO: 30.2 PG (ref 26–35)
MCHC RBC AUTO-ENTMCNC: 31.9 G/DL (ref 32–34.5)
MCV RBC AUTO: 94.8 FL (ref 80–99.9)
MONOCYTES NFR BLD: 0.54 K/UL (ref 0.1–0.95)
MONOCYTES NFR BLD: 5 % (ref 2–12)
NEUTROPHILS NFR BLD: 31 % (ref 43–80)
NEUTS SEG NFR BLD: 3.32 K/UL (ref 1.8–7.3)
PLATELET # BLD AUTO: 172 K/UL (ref 130–450)
PMV BLD AUTO: 10.6 FL (ref 7–12)
POTASSIUM SERPL-SCNC: 4.2 MMOL/L (ref 3.5–5)
PROT SERPL-MCNC: 5.5 G/DL (ref 6.4–8.3)
RBC # BLD AUTO: 4.27 M/UL (ref 3.8–5.8)
RBC # BLD: ABNORMAL 10*6/UL
SEND OUT REPORT: NORMAL
SODIUM SERPL-SCNC: 136 MMOL/L (ref 132–146)
TEST NAME: NORMAL
WBC # BLD: ABNORMAL 10*3/UL
WBC OTHER # BLD: 10.7 K/UL (ref 4.5–11.5)

## 2024-07-03 PROCEDURE — 2580000003 HC RX 258: Performed by: STUDENT IN AN ORGANIZED HEALTH CARE EDUCATION/TRAINING PROGRAM

## 2024-07-03 PROCEDURE — 6360000002 HC RX W HCPCS: Performed by: STUDENT IN AN ORGANIZED HEALTH CARE EDUCATION/TRAINING PROGRAM

## 2024-07-03 PROCEDURE — 2060000000 HC ICU INTERMEDIATE R&B

## 2024-07-03 PROCEDURE — 6370000000 HC RX 637 (ALT 250 FOR IP): Performed by: INTERNAL MEDICINE

## 2024-07-03 PROCEDURE — 6370000000 HC RX 637 (ALT 250 FOR IP): Performed by: STUDENT IN AN ORGANIZED HEALTH CARE EDUCATION/TRAINING PROGRAM

## 2024-07-03 PROCEDURE — 6360000002 HC RX W HCPCS

## 2024-07-03 PROCEDURE — P9047 ALBUMIN (HUMAN), 25%, 50ML: HCPCS | Performed by: INTERNAL MEDICINE

## 2024-07-03 PROCEDURE — 6360000002 HC RX W HCPCS: Performed by: INTERNAL MEDICINE

## 2024-07-03 PROCEDURE — 94640 AIRWAY INHALATION TREATMENT: CPT

## 2024-07-03 PROCEDURE — 2580000003 HC RX 258: Performed by: INTERNAL MEDICINE

## 2024-07-03 PROCEDURE — 80053 COMPREHEN METABOLIC PANEL: CPT

## 2024-07-03 PROCEDURE — 6360000002 HC RX W HCPCS: Performed by: NURSE PRACTITIONER

## 2024-07-03 PROCEDURE — 36415 COLL VENOUS BLD VENIPUNCTURE: CPT

## 2024-07-03 PROCEDURE — 2580000003 HC RX 258

## 2024-07-03 PROCEDURE — 6370000000 HC RX 637 (ALT 250 FOR IP): Performed by: NURSE PRACTITIONER

## 2024-07-03 PROCEDURE — 85025 COMPLETE CBC W/AUTO DIFF WBC: CPT

## 2024-07-03 PROCEDURE — 2700000000 HC OXYGEN THERAPY PER DAY

## 2024-07-03 RX ORDER — AZITHROMYCIN 500 MG/1
500 TABLET, FILM COATED ORAL DAILY
Qty: 9 TABLET | Refills: 0 | Status: SHIPPED | OUTPATIENT
Start: 2024-07-03 | End: 2024-07-12

## 2024-07-03 RX ORDER — DOXYCYCLINE HYCLATE 100 MG
100 TABLET ORAL 2 TIMES DAILY
Qty: 18 TABLET | Refills: 0 | Status: SHIPPED | OUTPATIENT
Start: 2024-07-03 | End: 2024-07-12

## 2024-07-03 RX ORDER — BUMETANIDE 1 MG/1
1 TABLET ORAL 2 TIMES DAILY
Qty: 60 TABLET | Refills: 3 | Status: SHIPPED | OUTPATIENT
Start: 2024-07-03

## 2024-07-03 RX ORDER — ALBUTEROL SULFATE 2.5 MG/3ML
2.5 SOLUTION RESPIRATORY (INHALATION) 4 TIMES DAILY PRN
Qty: 120 EACH | Refills: 3 | Status: SHIPPED | OUTPATIENT
Start: 2024-07-03 | End: 2024-07-04

## 2024-07-03 RX ORDER — PANTOPRAZOLE SODIUM 40 MG/1
40 TABLET, DELAYED RELEASE ORAL
Qty: 30 TABLET | Refills: 3 | Status: SHIPPED | OUTPATIENT
Start: 2024-07-04

## 2024-07-03 RX ORDER — AZITHROMYCIN 250 MG/1
500 TABLET, FILM COATED ORAL DAILY
Status: DISCONTINUED | OUTPATIENT
Start: 2024-07-03 | End: 2024-07-04 | Stop reason: HOSPADM

## 2024-07-03 RX ORDER — ALBUTEROL SULFATE 2.5 MG/3ML
2.5 SOLUTION RESPIRATORY (INHALATION)
Status: DISCONTINUED | OUTPATIENT
Start: 2024-07-03 | End: 2024-07-04 | Stop reason: HOSPADM

## 2024-07-03 RX ORDER — AZITHROMYCIN 500 MG/1
500 TABLET, FILM COATED ORAL DAILY
Qty: 3 TABLET | Refills: 0 | Status: SHIPPED | OUTPATIENT
Start: 2024-07-03 | End: 2024-07-03

## 2024-07-03 RX ORDER — DOXYCYCLINE HYCLATE 100 MG
100 TABLET ORAL 2 TIMES DAILY
Qty: 6 TABLET | Refills: 0 | Status: SHIPPED | OUTPATIENT
Start: 2024-07-03 | End: 2024-07-03

## 2024-07-03 RX ORDER — GUAIFENESIN 400 MG/1
400 TABLET ORAL DAILY
Qty: 56 TABLET | Refills: 0 | Status: SHIPPED | OUTPATIENT
Start: 2024-07-04

## 2024-07-03 RX ORDER — ALBUMIN (HUMAN) 12.5 G/50ML
25 SOLUTION INTRAVENOUS EVERY 8 HOURS
Status: DISCONTINUED | OUTPATIENT
Start: 2024-07-03 | End: 2024-07-04 | Stop reason: HOSPADM

## 2024-07-03 RX ORDER — METOLAZONE 2.5 MG/1
2.5 TABLET ORAL 2 TIMES DAILY
Status: DISCONTINUED | OUTPATIENT
Start: 2024-07-03 | End: 2024-07-04 | Stop reason: HOSPADM

## 2024-07-03 RX ADMIN — Medication 6 MG: at 21:01

## 2024-07-03 RX ADMIN — SODIUM CHLORIDE, PRESERVATIVE FREE 10 ML: 5 INJECTION INTRAVENOUS at 21:01

## 2024-07-03 RX ADMIN — PIPERACILLIN AND TAZOBACTAM 3375 MG: 3; .375 INJECTION, POWDER, LYOPHILIZED, FOR SOLUTION INTRAVENOUS at 06:15

## 2024-07-03 RX ADMIN — ALBUTEROL SULFATE 2.5 MG: 2.5 SOLUTION RESPIRATORY (INHALATION) at 20:39

## 2024-07-03 RX ADMIN — ZOLPIDEM TARTRATE 5 MG: 5 TABLET ORAL at 21:02

## 2024-07-03 RX ADMIN — METOLAZONE 2.5 MG: 2.5 TABLET ORAL at 21:01

## 2024-07-03 RX ADMIN — AZITHROMYCIN DIHYDRATE 500 MG: 250 TABLET ORAL at 15:39

## 2024-07-03 RX ADMIN — ALBUMIN (HUMAN) 25 G: 0.25 INJECTION, SOLUTION INTRAVENOUS at 12:30

## 2024-07-03 RX ADMIN — ACETAMINOPHEN 650 MG: 325 TABLET ORAL at 21:02

## 2024-07-03 RX ADMIN — BUMETANIDE 1 MG/HR: 0.25 INJECTION INTRAMUSCULAR; INTRAVENOUS at 21:12

## 2024-07-03 RX ADMIN — INSULIN LISPRO 4 UNITS: 100 INJECTION, SOLUTION INTRAVENOUS; SUBCUTANEOUS at 18:20

## 2024-07-03 RX ADMIN — ALBUMIN (HUMAN) 25 G: 0.25 INJECTION, SOLUTION INTRAVENOUS at 20:04

## 2024-07-03 RX ADMIN — DOXYCYCLINE HYCLATE 100 MG: 100 CAPSULE ORAL at 09:33

## 2024-07-03 RX ADMIN — METOLAZONE 2.5 MG: 2.5 TABLET ORAL at 12:33

## 2024-07-03 RX ADMIN — BUMETANIDE 0.5 MG/HR: 0.25 INJECTION INTRAMUSCULAR; INTRAVENOUS at 06:45

## 2024-07-03 RX ADMIN — ALBUTEROL SULFATE 2.5 MG: 2.5 SOLUTION RESPIRATORY (INHALATION) at 17:01

## 2024-07-03 RX ADMIN — DOXYCYCLINE HYCLATE 100 MG: 100 CAPSULE ORAL at 21:02

## 2024-07-03 NOTE — CARE COORDINATION
Had renal biopsy yesterday. Remains on bumex drip.  Amb pulse ox noted - pulmonology ntfd for oxygen order for discharge. Patient declining [facility] and no preferenece for DME. Ravi with  [facility] ntfd of need for oxygen.patient and family at bedside ntfd.Electronically signed by Caridad Jacob RN on 7/3/2024 at 11:31 AM    Ravi with  [facility]  NTFD of nebulizer order also.Electronically signed by Caridad Jacob RN on 7/3/2024 at 2:00 PM    David BROUSSARD NP pulmonology  ntfd that dx for nebulizer does not qualify and bedside RN also ntfd.Pulmonlogy may have to set him up for PFTS  in their office. They gave him an albuterol inhaler he use for now. Bedside RN updated.Electronically signed by Caridad Jacob RN on 7/3/2024 at 4:04 PM      The Plan for Transition of Care is related to the following treatment goals: adequate oxygenation    The Patient and/or patient representative  was provided with a choice of provider and agrees with the discharge plan. [x] Yes [] No    Freedom of choice list was provided with basic dialogue that supports the patient's individualized plan of care/goals, treatment preferences and shares the quality data associated with the providers. [x] Yes [] No

## 2024-07-03 NOTE — PLAN OF CARE
Problem: Discharge Planning  Goal: Discharge to home or other facility with appropriate resources  Outcome: Progressing     Problem: Safety - Adult  Goal: Free from fall injury  7/3/2024 0023 by Anaya Molina RN  Outcome: Progressing  7/2/2024 1037 by Donna Castro RN  Outcome: Progressing     Problem: Pain  Goal: Verbalizes/displays adequate comfort level or baseline comfort level  7/3/2024 0023 by Anaya Molina RN  Outcome: Progressing  7/2/2024 1037 by Donna Castro RN  Outcome: Progressing     Problem: Skin/Tissue Integrity  Goal: Absence of new skin breakdown  Description: 1.  Monitor for areas of redness and/or skin breakdown  2.  Assess vascular access sites hourly  3.  Every 4-6 hours minimum:  Change oxygen saturation probe site  4.  Every 4-6 hours:  If on nasal continuous positive airway pressure, respiratory therapy assess nares and determine need for appliance change or resting period.  Outcome: Progressing     Problem: Nutrition Deficit:  Goal: Optimize nutritional status  Outcome: Progressing

## 2024-07-03 NOTE — PLAN OF CARE
Problem: Safety - Adult  Goal: Free from fall injury  7/3/2024 1031 by Donna Castro RN  Outcome: Progressing  7/3/2024 0023 by Anaya Molina RN  Outcome: Progressing     Problem: Pain  Goal: Verbalizes/displays adequate comfort level or baseline comfort level  7/3/2024 1031 by Donna Castro RN  Outcome: Progressing  7/3/2024 0023 by Anaya Molina, RN  Outcome: Progressing

## 2024-07-04 VITALS
HEART RATE: 105 BPM | DIASTOLIC BLOOD PRESSURE: 59 MMHG | RESPIRATION RATE: 17 BRPM | TEMPERATURE: 98.2 F | HEIGHT: 74 IN | SYSTOLIC BLOOD PRESSURE: 132 MMHG | BODY MASS INDEX: 36.4 KG/M2 | OXYGEN SATURATION: 91 % | WEIGHT: 283.6 LBS

## 2024-07-04 LAB
ALBUMIN SERPL-MCNC: 3.3 G/DL (ref 3.5–5.2)
ALP SERPL-CCNC: 74 U/L (ref 40–129)
ALT SERPL-CCNC: 29 U/L (ref 0–40)
ANCA AB PATTERN SER IF-IMP: NORMAL
ANCA IGG TITR SER IF: NORMAL {TITER}
ANION GAP SERPL CALCULATED.3IONS-SCNC: 12 MMOL/L (ref 7–16)
AST SERPL-CCNC: 45 U/L (ref 0–39)
BASOPHILS # BLD: 0.09 K/UL (ref 0–0.2)
BASOPHILS NFR BLD: 1 % (ref 0–2)
BILIRUB SERPL-MCNC: 0.7 MG/DL (ref 0–1.2)
BUN SERPL-MCNC: 45 MG/DL (ref 6–23)
CALCIUM SERPL-MCNC: 8.7 MG/DL (ref 8.6–10.2)
CHLORIDE SERPL-SCNC: 95 MMOL/L (ref 98–107)
CO2 SERPL-SCNC: 28 MMOL/L (ref 22–29)
CREAT SERPL-MCNC: 1.7 MG/DL (ref 0.7–1.2)
DEPRECATED S PNEUM 1 IGG SER-MCNC: 0 AU/ML (ref 0–19)
EBV VCA IGG SER-ACNC: 355 U/ML
EOSINOPHIL # BLD: 0 K/UL (ref 0.05–0.5)
EOSINOPHILS RELATIVE PERCENT: 0 % (ref 0–6)
ERYTHROCYTE [DISTWIDTH] IN BLOOD BY AUTOMATED COUNT: 17.4 % (ref 11.5–15)
GFR, ESTIMATED: 46 ML/MIN/1.73M2
GLUCOSE SERPL-MCNC: 283 MG/DL (ref 74–99)
HBA1C MFR BLD: 6.5 % (ref 4–5.6)
HCT VFR BLD AUTO: 37.6 % (ref 37–54)
HGB BLD-MCNC: 12.4 G/DL (ref 12.5–16.5)
LYMPHOCYTES NFR BLD: 6.53 K/UL (ref 1.5–4)
LYMPHOCYTES RELATIVE PERCENT: 75 % (ref 20–42)
MCH RBC QN AUTO: 31.2 PG (ref 26–35)
MCHC RBC AUTO-ENTMCNC: 33 G/DL (ref 32–34.5)
MCV RBC AUTO: 94.7 FL (ref 80–99.9)
MICROORGANISM SPEC CULT: NORMAL
MICROORGANISM SPEC CULT: NORMAL
MONOCYTES NFR BLD: 0.61 K/UL (ref 0.1–0.95)
MONOCYTES NFR BLD: 7 % (ref 2–12)
NEUTROPHILS NFR BLD: 17 % (ref 43–80)
NEUTS SEG NFR BLD: 1.48 K/UL (ref 1.8–7.3)
PLATELET # BLD AUTO: 153 K/UL (ref 130–450)
PLATELET ESTIMATE: ABNORMAL
PMV BLD AUTO: 10.6 FL (ref 7–12)
POTASSIUM SERPL-SCNC: 4.1 MMOL/L (ref 3.5–5)
PROT SERPL-MCNC: 5.7 G/DL (ref 6.4–8.3)
RBC # BLD AUTO: 3.97 M/UL (ref 3.8–5.8)
RBC # BLD: ABNORMAL 10*6/UL
SERINE PROTEASE 3, IGG: 1 AU/ML (ref 0–19)
SERVICE CMNT-IMP: NORMAL
SERVICE CMNT-IMP: NORMAL
SODIUM SERPL-SCNC: 135 MMOL/L (ref 132–146)
SPECIMEN DESCRIPTION: NORMAL
SPECIMEN DESCRIPTION: NORMAL
WBC # BLD: ABNORMAL 10*3/UL
WBC OTHER # BLD: 8.7 K/UL (ref 4.5–11.5)

## 2024-07-04 PROCEDURE — 94640 AIRWAY INHALATION TREATMENT: CPT

## 2024-07-04 PROCEDURE — 36415 COLL VENOUS BLD VENIPUNCTURE: CPT

## 2024-07-04 PROCEDURE — 6360000002 HC RX W HCPCS: Performed by: NURSE PRACTITIONER

## 2024-07-04 PROCEDURE — 82595 ASSAY OF CRYOGLOBULIN: CPT

## 2024-07-04 PROCEDURE — 2700000000 HC OXYGEN THERAPY PER DAY

## 2024-07-04 PROCEDURE — 94669 MECHANICAL CHEST WALL OSCILL: CPT

## 2024-07-04 PROCEDURE — 6360000002 HC RX W HCPCS: Performed by: INTERNAL MEDICINE

## 2024-07-04 PROCEDURE — 6370000000 HC RX 637 (ALT 250 FOR IP): Performed by: NURSE PRACTITIONER

## 2024-07-04 PROCEDURE — 6370000000 HC RX 637 (ALT 250 FOR IP): Performed by: STUDENT IN AN ORGANIZED HEALTH CARE EDUCATION/TRAINING PROGRAM

## 2024-07-04 PROCEDURE — 6370000000 HC RX 637 (ALT 250 FOR IP): Performed by: INTERNAL MEDICINE

## 2024-07-04 PROCEDURE — 83036 HEMOGLOBIN GLYCOSYLATED A1C: CPT

## 2024-07-04 PROCEDURE — 86235 NUCLEAR ANTIGEN ANTIBODY: CPT

## 2024-07-04 PROCEDURE — 85025 COMPLETE CBC W/AUTO DIFF WBC: CPT

## 2024-07-04 PROCEDURE — 80053 COMPREHEN METABOLIC PANEL: CPT

## 2024-07-04 PROCEDURE — P9047 ALBUMIN (HUMAN), 25%, 50ML: HCPCS | Performed by: INTERNAL MEDICINE

## 2024-07-04 RX ORDER — ALBUTEROL SULFATE 2.5 MG/3ML
2.5 SOLUTION RESPIRATORY (INHALATION) 4 TIMES DAILY PRN
Qty: 120 EACH | Refills: 3 | Status: SHIPPED | OUTPATIENT
Start: 2024-07-04

## 2024-07-04 RX ORDER — ZOLPIDEM TARTRATE 5 MG/1
5 TABLET ORAL NIGHTLY PRN
Qty: 14 TABLET | Refills: 0
Start: 2024-07-04 | End: 2024-07-18

## 2024-07-04 RX ORDER — ZOLPIDEM TARTRATE 5 MG/1
5 TABLET ORAL NIGHTLY PRN
Qty: 14 TABLET | Refills: 0 | Status: SHIPPED | OUTPATIENT
Start: 2024-07-04 | End: 2024-07-04

## 2024-07-04 RX ORDER — METOLAZONE 2.5 MG/1
2.5 TABLET ORAL 2 TIMES DAILY
Qty: 14 TABLET | Refills: 0 | Status: SHIPPED | OUTPATIENT
Start: 2024-07-04 | End: 2024-07-04

## 2024-07-04 RX ORDER — METOLAZONE 2.5 MG/1
2.5 TABLET ORAL 2 TIMES DAILY
Qty: 14 TABLET | Refills: 0 | Status: SHIPPED | OUTPATIENT
Start: 2024-07-04 | End: 2024-07-11

## 2024-07-04 RX ADMIN — ALBUTEROL SULFATE 2.5 MG: 2.5 SOLUTION RESPIRATORY (INHALATION) at 08:27

## 2024-07-04 RX ADMIN — METOLAZONE 2.5 MG: 2.5 TABLET ORAL at 09:14

## 2024-07-04 RX ADMIN — ALBUTEROL SULFATE 2.5 MG: 2.5 SOLUTION RESPIRATORY (INHALATION) at 11:29

## 2024-07-04 RX ADMIN — DOXYCYCLINE HYCLATE 100 MG: 100 CAPSULE ORAL at 09:14

## 2024-07-04 RX ADMIN — PANTOPRAZOLE SODIUM 40 MG: 40 TABLET, DELAYED RELEASE ORAL at 05:18

## 2024-07-04 RX ADMIN — INSULIN LISPRO 6 UNITS: 100 INJECTION, SOLUTION INTRAVENOUS; SUBCUTANEOUS at 09:18

## 2024-07-04 RX ADMIN — ALBUMIN (HUMAN) 25 G: 0.25 INJECTION, SOLUTION INTRAVENOUS at 04:31

## 2024-07-04 RX ADMIN — AZITHROMYCIN DIHYDRATE 500 MG: 250 TABLET ORAL at 09:14

## 2024-07-05 LAB
C282Y HEMOCHROMATOSIS MUT: NEGATIVE
CMV IGM SERPL QL IA: PRESENT
H63D HEMOCHROMATOSIS MUT: NORMAL
HEMOCHROMATOSIS MUTATION INT: NORMAL
HEMOCHROMATOSIS SPECIMEN: NORMAL
HSV 1 SUBTYPE BY PCR: NOT DETECTED
HSV 2 SUBTYPE BY PCR: NOT DETECTED
HSV SOURCE: NORMAL
S65C HEMOCHROMATOSIS MUT: NEGATIVE
SEND OUT REPORT: NORMAL
SEND OUT REPORT: NORMAL
SJOGREN'S ANTIBODIES (SSA): NEGATIVE
SJOGREN'S ANTIBODIES (SSB): NEGATIVE
SURGICAL PATHOLOGY REPORT: NORMAL
TEST NAME: NORMAL
TEST NAME: NORMAL

## 2024-07-05 NOTE — DISCHARGE SUMMARY
symptoms please call primary care physician or return to the ER immediately  Diet: No diet orders on file    Preparing for this patient's discharge, including paperwork, orders, instructions, and meeting with patient did required >35 minutes.    Electronically signed by Tanner Van MD on 7/5/2024 at 9:14 AM

## 2024-07-05 NOTE — PROGRESS NOTES
Paul Burns M.D.,[facility]  Leonel Hernandez D.O., AMARI., [facility]  Aislinn Llanos M.D.  Chio Jarquin M.D.   Dr Paulo Honeycutt, MADHAV.O.      Daily Pulmonary Progress Note    Patient:  David Moyer 58 y.o. male MRN: [id number]            Synopsis     We are following patient for exertional dyspnea    \"CC\" shortness of breath    Code status: FULL CODE      Subjective      Patient was seen and examined lying in bed on 4LNC, pox 94%. Daughter concerned about his POX dropping with exertion , was on RA . He is starting to have a productive cough but thick mucous hard to bring up , known paralyzed hemidiaphragm     Review of Systems:  Constitutional: weakness and fatigue   Skin: Denies pigmentation, dark lesions, and rashes   HEENT: Denies hearing loss, tinnitus, ear drainage, epistaxis, sore throat, and hoarseness.  Cardiovascular: Denies palpitations, chest pain, and chest pressure.  Respiratory: dyspnea with exertion   Gastrointestinal: Denies nausea, vomiting, poor appetite, diarrhea, heartburn or reflux  Genitourinary: Denies dysuria, frequency, urgency or hematuria  Musculoskeletal: bilateral leg swelling   Neurological: Denies dizziness, vertigo, headache, and focal weakness  Psychological: Denies anxiety and depression  Endocrine: Denies heat intolerance and cold intolerance  Hematopoietic/Lymphatic: Denies bleeding problems and blood transfusions    24-hour events:  No new events    Objective   OBJECTIVE:   /66   Pulse (!) 105   Temp 98.7 °F (37.1 °C) (Oral)   Resp 18   Ht 1.88 m (6' 2\")   Wt 133.2 kg (293 lb 9.6 oz)   SpO2 94%   BMI 37.70 kg/m²   SpO2 Readings from Last 1 Encounters:   06/29/24 94%        I/O:    Intake/Output Summary (Last 24 hours) at 6/29/2024 1221  Last data filed at 6/29/2024 1037  Gross per 24 hour   Intake 1369.86 ml   Output 2350 ml   Net -980.14 ml          CURRENT MEDS :  Scheduled Meds:   piperacillin-tazobactam  3,375 mg IntraVENous Q8H    ipratropium 0.5 
           Paul Burns M.D.,[facility]  Leonel Hernandez D.O., FCASEY., [facility]  Aislinn Llanos M.D.  Chio Jarquin M.D.   Dr Paulo Honeycutt, MADHAV.O.      Daily Pulmonary Progress Note    Patient:  David Moyer 58 y.o. male MRN: [id number]            Synopsis     We are following patient for exertional dyspnea    \"CC\" shortness of breath    Code status: FULL CODE      Subjective      Patient was seen and examined.  Tolerated renal biopsy yesterday.  Await path.  Not interested in using CPAP or undergoing testing for sleep apnea.  Orders all canceled based on patient request.  Will need O2 for DC 3 L.  DME orders placed based on ambulatory testing.  He did desaturate with ambulation requiring 3 L to recover. Currently receiving IV bumex, Albumin 25%.      Pulse ox was 90 % on room air at rest.  Ambulated patient on room air.  Oxygen saturation was  85% on room air while ambulating 200 ft.  Oxygen applied @ 3L  Recovery pulse ox was 94% on 3 liters of oxygen while ambulating 200 ft.          Review of Systems:  Constitutional: weakness and fatigue   Skin: Denies pigmentation, dark lesions, and rashes   HEENT: Denies hearing loss, tinnitus, ear drainage, epistaxis, sore throat, and hoarseness.  Cardiovascular: Denies palpitations, chest pain, and chest pressure.  Respiratory: dyspnea with exertion   Gastrointestinal: Denies nausea, vomiting, poor appetite, diarrhea, heartburn or reflux  Genitourinary: Denies dysuria, frequency, urgency or hematuria  Musculoskeletal: bilateral leg swelling   Neurological: Denies dizziness, vertigo, headache, and focal weakness  Psychological: Denies anxiety and depression  Endocrine: Denies heat intolerance and cold intolerance  Hematopoietic/Lymphatic: Denies bleeding problems and blood transfusions    24-hour events:  No new events    Objective   OBJECTIVE:   BP (!) 124/57   Pulse (!) 104   Temp 97.7 °F (36.5 °C) (Oral)   Resp 18   Ht 1.88 m (6' 2.02\")   Wt 134.3 kg (296 lb)   SpO2 
    Inpatient Cardiology Progress note        SUBJECTIVE/OBJECTIVE:   Continues to have some shortness of breath.  Lower extremity edema improving  On Bumex drip            PHYSICAL EXAM:   BP (!) 144/62   Pulse 92   Temp 98.4 °F (36.9 °C) (Oral)   Resp 16   Ht 1.88 m (6' 2\")   Wt 134.7 kg (297 lb)   SpO2 94%   BMI 38.13 kg/m²    B/P Range last 24 hours: Systolic (24hrs), Av , Min:90 , Max:144    Diastolic (24hrs), Av, Min:55, Max:76      GENERAL: NAD   HEAD: Normocephalic, atraumatic.   NECK: No JVD. No carotid bruits. No neck masses.    CARDIOVASCULAR: Normal rate, regular rhythm, normal S1, S2, no MRG    LUNGS: Clear to auscultation bilaterally, no wheezing.    ABDOMEN: Abdomen is soft and not distended. No tenderness.    EXTREMITIES: There is 2+ pedal edema (improving).   MUSCULOSKELETAL: No joint swelling. Normal range of motion    SKIN: Skin is moist. No ulcers or lesions.   NEUROLOGICAL: No evidence of obvious neurological deficits.    PSYCHOLOGICAL: Patient is in normal mood.           Intake/Output Summary (Last 24 hours) at 2024  Last data filed at [address]  Gross per 24 hour   Intake 1839.86 ml   Output 2650 ml   Net -810.14 ml       Weight:   Wt Readings from Last 3 Encounters:   24 134.7 kg (297 lb)   10/15/23 130 kg (286 lb 9.6 oz)   22 122.5 kg (270 lb)       Current Inpatient Medications:   albumin human 25%  25 g IntraVENous Q8H    piperacillin-tazobactam  3,375 mg IntraVENous Q8H    ipratropium 0.5 mg-albuterol 2.5 mg  1 Dose Inhalation Q4H WA RT    calcium carbonate  500 mg Oral Once    sodium bicarbonate  650 mg Oral BID    sodium chloride flush  10 mL IntraVENous 2 times per day    enoxaparin  30 mg SubCUTAneous BID    aspirin  81 mg Oral Daily    insulin lispro  0-8 Units SubCUTAneous TID WC    insulin lispro  0-4 Units SubCUTAneous Nightly       IV Infusions (if any):   bumetanide (BUMEX) 12.5 mg in sodium chloride 0.9 % 125 mL infusion 0.5 mg/hr 
  Physician Progress Note      PATIENT:               CARON TEIXEIRA  [facility] #:                  [id number]  :                       1965  ADMIT DATE:       2024 9:09 AM  DISCH DATE:  RESPONDING  PROVIDER #:        Tanner Mckeon MD          QUERY TEXT:    Pt admitted with pneumonia. Pt noted to have WBC 12.6 with HR . If   possible, please document in the progress notes and discharge summary if you   are evaluating and /or treating any of the following:    The medical record reflects the following:  Risk Factors: pneumonia  Clinical Indicators: WBC 12.6, crea 2.0 (noted RUFINA), procal 0.59, HR ,   RR 16-28, O2 sat 91-94%  Treatment: NS 1000 ml/IV bolus x2, Vibramycin IV, Rocephin IV, O2 at 4l/nc    Thank you,  Lars RN, CCDS  [phone]  Options provided:  -- Sepsis, present on admission  -- Sepsis, present on admission, now resolved  -- Pneumonia without Sepsis  -- Other - I will add my own diagnosis  -- Disagree - Not applicable / Not valid  -- Disagree - Clinically unable to determine / Unknown  -- Refer to Clinical Documentation Reviewer    PROVIDER RESPONSE TEXT:    This patient has sepsis which was present on admission.    Query created by: Lars Cyr on 2024 9:19 AM      QUERY TEXT:    Patient admitted with pneumonia. Noted documentation of acute respiratory   failure in H/P dated . In order to support the diagnosis of acute   respiratory failure, please include additional clinical indicators in your   documentation.  Or please document if the diagnosis of acute respiratory   failure has been ruled out after further study.    The medical record reflects the following:  Risk Factors: pneumonia, RUFINA with volume overload  Clinical Indicators: presented with shortness of breath, RR 16-28, O2 sat 91%,   per NN OH; documentation reflected \"is not in acute distress\" and \"pulmonary   effort is normal\" and decreased BS on assessment; no work of breathing or   labored 
 CLINICAL PHARMACY NOTE: MEDS TO BEDS    Total # of Prescriptions Filled: 3   The following medications were delivered to the patient:  Chest congestion 400  Bumetanide 1  Pantoprazole 40      Additional Documentation:  Delivered to pt   
4 Eyes Skin Assessment     NAME:  David Moyer  YOB: 1965  MEDICAL RECORD NUMBER:  [id number]    The patient is being assessed for  Admission    I agree that at least one RN has performed a thorough Head to Toe Skin Assessment on the patient. ALL assessment sites listed below have been assessed.      Areas assessed by both nurses:    Head, Face, Ears, Shoulders, Back, Chest, Arms, Elbows, Hands, Sacrum. Buttock, Coccyx, Ischium, Legs. Feet and Heels, and Under Medical Devices         Does the Patient have a Wound? No noted wound(s)       Forrest Prevention initiated by RN: No  Wound Care Orders initiated by RN: No    Pressure Injury (Stage 3,4, Unstageable, DTI, NWPT, and Complex wounds) if present, place Wound referral order by RN under : No    New Ostomies, if present place, Ostomy referral order under : No     Nurse 1 eSignature: Electronically signed by Ling Duff RN on 6/25/24 at 6:49 PM EDT    **SHARE this note so that the co-signing nurse can place an eSignature**    Nurse 2 eSignature: Electronically signed by Porsha Aceves RN on 6/25/24 at 6:52 PM EDT   
Call placed to respiratory for breathing treatment.   
Cardio consult sent to Xochilt Welch  
Che Jackson to see if patient able to discharge   
Chest Vest therapy initiated. Patient tolerated therapy well.   
Comprehensive Nutrition Assessment    Type and Reason for Visit:  Initial, RD Nutrition Re-Screen/LOS    Nutrition Recommendations/Plan:   Pt. Currently NPO. Start ONS w/ diet advancement. Recommend Ensure HP BID to promote PO intake. Will continue to monitor.     Malnutrition Assessment:  Malnutrition Status:  At risk for malnutrition (Comment) (07/02/24 1315)    Context:  Acute Illness     Findings of the 6 clinical characteristics of malnutrition:  Energy Intake:  50% or less of estimated energy requirements for 5 or more days  Weight Loss:  No significant weight loss     Body Fat Loss:  Unable to assess     Muscle Mass Loss:  Unable to assess    Fluid Accumulation:  Unable to assess     Strength:  Not Performed    Nutrition Assessment:    Pt. admitted for evaluation of diffuse body aches, shortness of breath, and generalzed fatigue. Sent by PCP for concern CHF and BL LE edema. Noted RUFINA stage I on a likely underlying CKD 3 A per nephrology. Pulmonology following for PNA. General surgery following for elevated LFTs (improving) and porcelain gallbladder; patient is for OP elective lap johann. No PMHx on file. Pt. averaging 25-50% at most meals. Currently NPO for testing/procedure. Will start ONS to promote PO intake w/ diet and monitor.    Nutrition Related Findings:    A&Ox4, -I/O(2.6L), +2 edema, rounded abd, active BS, reduced appetite, elevated BUN/Cr, elevated ALT/AST, hyperglycemia, Wound Type: None       Current Nutrition Intake & Therapies:    Average Meal Intake: 26-50%  Average Supplements Intake: None Ordered  Diet NPO    Anthropometric Measures:  Height: 188 cm (6' 2.02\")  Ideal Body Weight (IBW): 190 lbs (86 kg)    Admission Body Weight: 133.8 kg (294 lb 15.6 oz) (6/27 standing scale)  Current Body Weight: 133.9 kg (295 lb 3.1 oz) (7/01 standing scale/actual), 155.4 % IBW. Weight Source: Standing Scale  Current BMI (kg/m2): 37.9  Usual Body Weight: 130 kg (286 lb 9.6 oz) (10/15/23)  % Weight Change 
Date: 7/1/2024    Time: 9:21 PM    Patient Placed On BIPAP/CPAP/ Non-Invasive Ventilation?  Yes    If no must comment.  Facial area red/color change? No           If YES are Blister/Lesion present?No   If yes must notify nursing staff  BIPAP/CPAP skin barrier?  Yes    Skin barrier type:mepilexlite       Comments:        Kelsi Hernadez RCP  
Discussed patient and IR procedure with bedside RN, all questions answered. Will call when able to send for patient.  
Dr Kuhn notified of oncology consult per office personnel.  Pt added to census  
Dr Newton notified of GI consult via [employer].  Pt added to census  
Dr THERESA Luevano notified of consult per answering service.  Pt added to census  
Dr Young notified of consult per answering service.  Pt added to census  
GENERAL SURGERY  DAILY PROGRESS NOTE  6/27/2024    Chief Complaint   Patient presents with    Shortness of Breath     Patient sent by PCP for CHF and bilateral leg swelling        ATTENDING PHYSICIAN PROGRESS NOTE      I have examined the patient and performed the key aspects of physical exam.  I have independently reviewed the record including all pertinent and new radiology images and laboratory findings as well as reviewed all pertinent provider documentation), and discussed the case with the surgical team.  I agree with the assessment and plan with the following additions, corrections, and changes. 14pt review of symptoms completed and negative except as mentioned.      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    
GI/Advanced Endoscopy PROGRESS NOTE        Patient Presents with/Seen in Consultation For    Reason for Consult: Elevated liver chemistries   Chief complaints: SOB    Subjective:   Patient seen resting comfortably in bed, family at bedside.  No current GI complaints at this time.  Plan of care discussed with patient and family at bedside.    Review of Systems  Aside from what was mentioned in the PMH and HPI, essentially unremarkable, all others negative.    Objective:     /79   Pulse 96   Temp 98.4 °F (36.9 °C) (Oral)   Resp 18   Ht 1.88 m (6' 2\")   Wt 133.9 kg (295 lb 3.2 oz)   SpO2 92%   BMI 37.90 kg/m²     General appearance: alert, awake, laying in bed and cooperative  Eyes: conjunctiva pale, sclera anicteric. PERRL.  Lungs: clear to auscultation bilaterally, O2 nasal cannula  Heart: regular rate and rhythm, no murmur, 2+ pulses; 2+ edema  Abdomen: soft, non-tender; bowel sounds normal; no masses,  no organomegaly  Extremities: extremities 2+ edema  Pulses: 2+ and symmetric  Skin: Skin color, texture, turgor normal.   Neurologic: Grossly normal    [START ON 7/2/2024] pantoprazole (PROTONIX) tablet 40 mg, QAM AC  guaiFENesin tablet 400 mg, Daily  albuterol (PROVENTIL) (2.5 MG/3ML) 0.083% nebulizer solution 2.5 mg, 4x Daily PRN  HYDROcodone homatropine (HYCODAN) 5-1.5 MG/5ML solution 5 mL, Q4H PRN  doxycycline hyclate (VIBRAMYCIN) capsule 100 mg, BID  azithromycin (ZITHROMAX) 500 mg in sodium chloride 0.9 % 250 mL IVPB ([id number]), Q24H  bumetanide (BUMEX) 12.5 mg in sodium chloride 0.9 % 125 mL infusion, Continuous  prochlorperazine (COMPAZINE) injection 10 mg, Q6H PRN  piperacillin-tazobactam (ZOSYN) 3,375 mg in sodium chloride 0.9 % 50 mL IVPB ([id number]), Q8H  zolpidem (AMBIEN) tablet 5 mg, Nightly PRN  calcium carbonate (TUMS) chewable tablet 500 mg, Once  sodium chloride flush 0.9 % injection 10 mL, 2 times per day  sodium chloride flush 0.9 % injection 10 mL, PRN  0.9 % sodium chloride infusion, 
GI/Advanced Endoscopy PROGRESS NOTE        Patient Presents with/Seen in Consultation For    Reason for Consult: Elevated liver chemistries   Chief complaints: SOB    Subjective:   Patient seen resting in bed  Admits to back pain from hospital bed  Daughter is present  Patient seems mildly improved symptom wise. Desiring to go home      Review of Systems  Aside from what was mentioned in the PMH and HPI, essentially unremarkable, all others negative.    Objective:     BP (!) 124/57   Pulse (!) 104   Temp 97.7 °F (36.5 °C) (Oral)   Resp 18   Ht 1.88 m (6' 2.02\")   Wt 134.3 kg (296 lb)   SpO2 90%   BMI 37.99 kg/m²     General appearance: alert, awake, laying in bed and cooperative  Eyes: conjunctiva pale, sclera anicteric. PERRL.  Lungs: clear to auscultation bilaterally, O2 nasal cannula  Heart: regular rate and rhythm, no murmur, 2+ pulses; 2+ edema  Abdomen: soft, non-tender; bowel sounds normal; no masses,  no organomegaly  Extremities: extremities 2+ edema  Pulses: 2+ and symmetric  Skin: Skin color, texture, turgor normal.   Neurologic: Grossly normal    metOLazone (ZAROXOLYN) tablet 2.5 mg, BID  albumin human 25% IV solution 25 g, Q8H  azithromycin (ZITHROMAX) tablet 500 mg, Daily  albuterol (PROVENTIL) (2.5 MG/3ML) 0.083% nebulizer solution 2.5 mg, 4x Daily RT  pantoprazole (PROTONIX) tablet 40 mg, QAM AC  guaiFENesin tablet 400 mg, Daily  HYDROcodone homatropine (HYCODAN) 5-1.5 MG/5ML solution 5 mL, Q4H PRN  doxycycline hyclate (VIBRAMYCIN) capsule 100 mg, BID  bumetanide (BUMEX) 12.5 mg in sodium chloride 0.9 % 125 mL infusion, Continuous  prochlorperazine (COMPAZINE) injection 10 mg, Q6H PRN  zolpidem (AMBIEN) tablet 5 mg, Nightly PRN  calcium carbonate (TUMS) chewable tablet 500 mg, Once  sodium chloride flush 0.9 % injection 10 mL, 2 times per day  sodium chloride flush 0.9 % injection 10 mL, PRN  0.9 % sodium chloride infusion, PRN  potassium chloride (KLOR-CON M) extended release tablet 40 mEq, 
GI/Advanced Endoscopy PROGRESS NOTE        Patient Presents with/Seen in Consultation For    Reason for Consult: Elevated liver chemistries   Chief complaints: SOB    Subjective:   Patient seen sitting on edge of bed feels ok today.   Family is present (daughter and wife)  Labs appear status quo, mild bump in WBC  No abd pain or nausea. Tolerating diet however decrease appetite. POC d/w pt.     Review of Systems  Aside from what was mentioned in the PMH and HPI, essentially unremarkable, all others negative.    Objective:     /69   Pulse (!) 104   Temp 98.1 °F (36.7 °C) (Oral)   Resp 20   Ht 1.88 m (6' 2\")   Wt 132.7 kg (292 lb 9.6 oz)   SpO2 93%   BMI 37.57 kg/m²     General appearance: alert, awake, sitting on edge of bed and cooperative  Eyes: conjunctiva pale, sclera anicteric. PERRL.  Lungs: clear to auscultation bilaterally, O2 nasal cannula  Heart: regular rate and rhythm, no murmur, 2+ pulses; 2+ edema  Abdomen: soft, non-tender; bowel sounds normal; no masses,  no organomegaly  Extremities: extremities 2+ edema  Pulses: 2+ and symmetric  Skin: Skin color, texture, turgor normal.   Neurologic: Grossly normal    [START ON 7/1/2024] guaiFENesin tablet 400 mg, Daily  HYDROcodone homatropine (HYCODAN) 5-1.5 MG/5ML solution 5 mL, Q4H PRN  doxycycline hyclate (VIBRAMYCIN) capsule 100 mg, BID  azithromycin (ZITHROMAX) 500 mg in sodium chloride 0.9 % 250 mL IVPB ([id number]), Q24H  albuterol (PROVENTIL) (2.5 MG/3ML) 0.083% nebulizer solution 2.5 mg, 4x Daily RT  bumetanide (BUMEX) 12.5 mg in sodium chloride 0.9 % 125 mL infusion, Continuous  prochlorperazine (COMPAZINE) injection 10 mg, Q6H PRN  piperacillin-tazobactam (ZOSYN) 3,375 mg in sodium chloride 0.9 % 50 mL IVPB ([id number]), Q8H  zolpidem (AMBIEN) tablet 5 mg, Nightly PRN  calcium carbonate (TUMS) chewable tablet 500 mg, Once  sodium chloride flush 0.9 % injection 10 mL, 2 times per day  sodium chloride flush 0.9 % injection 10 mL, PRN  0.9 % 
General Surgery   Daily Progress Note      Patient's Name/Date of Birth: David Moyer / 1965    Date: July 1, 2024       Patient Active Problem List   Diagnosis    Acute coronary syndrome (HCC)    Right bundle branch block    Acute respiratory failure with hypoxia (HCC)    Respiratory distress       Subjective: Pt tolerating regular diet but has decreased appetite since admission. Denies nausea and vomiting (resolved last week). He feels distended but is otherwise denying abdominal pain.          Objective:  /63   Pulse 99   Temp 98.4 °F (36.9 °C) (Oral)   Resp 18   Ht 1.88 m (6' 2\")   Wt 133.9 kg (295 lb 3.2 oz)   SpO2 93%   BMI 37.90 kg/m²   Labs:  Recent Labs     06/29/24  1320 06/30/24  0635 07/01/24  0515   WBC 14.8* 17.7* 17.6*   HGB 12.9 13.7 13.7   HCT 39.6 43.2 41.9       Recent Labs     06/29/24  0606 06/30/24  0635 07/01/24  0515    135 135   K 4.2 4.5 4.6    98 100   CO2 23 25 21*   BUN 35* 34* 41*   CREATININE 2.3* 2.3* 2.4*       Recent Labs     06/29/24  0606 06/30/24  0635 07/01/24  0515   ALKPHOS 120 122 108   * 93* 69*   * 151* 89*   BILITOT 0.6 0.6 0.5           General appearance: NAD  Head: NCAT, PERRL, EOMI, pink conjunctiva  Neck: supple, no masses  Lungs: on 4 L NC.   Heart: warm throughout  Abdomen: soft, non-distended, mild tenderness to palpation of mid-abdomen, RUQ and RLQ.   Skin: no visible lesions  Extremities: atraumatic, bilateral ankles with 3+ pitting edema       Assessment/Plan:  David Moyer is a 58 y.o. male with porcelian gallbladder    - Still no indication that patient's leukocytosis is related to GB    - Ok for regular diet   - Follow up as OP for elective lap johann   - Follow up with GI for scopes       Ewelina Griffith MD  PGY-1 General Surgery Resident    ATTENDING PHYSICIAN PROGRESS NOTE      I have examined the patient and performed the key aspects of physical exam.  I have independently reviewed the record including 
Internal Medicine Progress Note    Patient's name: David Moyer  : 1965  Chief complaints (on day of admission): Shortness of Breath (Patient sent by PCP for CHF and bilateral leg swelling )  Admission date: 2024  Date of service: 2024   Room: [address] IMCU/NEURO  Primary care physician: Vern Jacques MD  Reason for visit: Follow-up for shortness of breath     Subjective  David was seen and examined at bedside     Extensive and lengthy visit   I am not sure what to make of his symptoms   He has been worked up in the past and during this admission   He may need a kidney biopsy   In terms of his shortness of breath he definitely needs ADRIANA testing but was quite reluctant to hear this for some reason   Appreciate any consultant advice or recommendations on this one     Review of Systems  There are no new complaints of chest pain, shortness of breath, abdominal pain, nausea, vomiting, diarrhea, constipation unless otherwise mentioned above.     Hospital Medications  Current Facility-Administered Medications   Medication Dose Route Frequency Provider Last Rate Last Admin    sodium zirconium cyclosilicate (LOKELMA) oral suspension 10 g  10 g Oral Once Shamika Roa APRN - CNP        zolpidem (AMBIEN) tablet 5 mg  5 mg Oral Nightly PRN JanetMica ramirez E, DO   5 mg at 24    metroNIDAZOLE (FLAGYL) 500 mg in 0.9% NaCl 100 mL IVPB premix  500 mg IntraVENous Q8H JanetMica ramirez E, DO   Stopped at 24 1030    cefTRIAXone (ROCEPHIN) 2,000 mg in sterile water 20 mL IV syringe  2,000 mg IntraVENous Q24H JanetMica ramirez E, DO   2,000 mg at 24 1512    calcium carbonate (TUMS) chewable tablet 500 mg  500 mg Oral Once Shamika Roa APRN - CNP        sodium bicarbonate tablet 650 mg  650 mg Oral BID Shamika Roa APRN - CNP   650 mg at 24 0857    bumetanide (BUMEX) injection 1 mg  1 mg IntraVENous BID Shamika Roa APRN - CNP   1 mg at 24 0857    
Internal Medicine Progress Note    Patient's name: David Moyer  : 1965  Chief complaints (on day of admission): Shortness of Breath (Patient sent by PCP for CHF and bilateral leg swelling )  Admission date: 2024  Date of service: 2024   Room: [address] IMCU/NEURO  Primary care physician: Vern Jacques MD  Reason for visit: Follow-up for shortness of breath     Subjective  David was seen and examined at bedside     Switched to zosyn yesterday WBC still rising   Spoke with Dr Trejo given the persistently elevated LFTs   He is going to consider obtaining further imaging of the RUQ appreciate recommendations  Spoke with Dr Dye in regard to prospect of potential renal biopsy at some point    Continue to defer diuresis to nephrology     Review of Systems  There are no new complaints of chest pain, shortness of breath, abdominal pain, nausea, vomiting, diarrhea, constipation unless otherwise mentioned above.     Hospital Medications  Current Facility-Administered Medications   Medication Dose Route Frequency Provider Last Rate Last Admin    bumetanide (BUMEX) 12.5 mg in sodium chloride 0.9 % 125 mL infusion  0.5 mg/hr IntraVENous Continuous Shamika Roa APRN - CNP        albumin human 25% IV solution 25 g  25 g IntraVENous Q8H Shamika Roa APRN - CNP        piperacillin-tazobactam (ZOSYN) 3,375 mg in sodium chloride 0.9 % 50 mL IVPB ([id number])  3,375 mg IntraVENous Q8H Tanner Van MD   Stopped at 24 1103    perflutren lipid microspheres (DEFINITY) injection 1.5 mL  1.5 mL IntraVENous ONCE PRN Arlin Delacruz APRN - CNP        ipratropium 0.5 mg-albuterol 2.5 mg (DUONEB) nebulizer solution 1 Dose  1 Dose Inhalation Q4H WA RT Arlin Delacruz APRN - CNP   1 Dose at 24 1014    zolpidem (AMBIEN) tablet 5 mg  5 mg Oral Nightly PRN Mica Gonzales DO   5 mg at 24 2123    calcium carbonate (TUMS) chewable tablet 500 mg  500 mg Oral Once Shamika Roa APRN - 
Internal Medicine Progress Note    Patient's name: David Moyer  : 1965  Chief complaints (on day of admission): Shortness of Breath (Patient sent by PCP for CHF and bilateral leg swelling )  Admission date: 2024  Date of service: 2024   Room: [address] IMCU/NEURO  Primary care physician: Vern Jacques MD  Reason for visit: Follow-up for shortness of breath     Subjective  David was seen and examined at bedside     Angry about the wait for renal biopsy  On room air   Less edematous   WBC greatly improved  Doing well overall   Hated the NIV   Spoke with Dr Honeycutt     Review of Systems  There are no new complaints of chest pain, shortness of breath, abdominal pain, nausea, vomiting, diarrhea, constipation unless otherwise mentioned above.     Hospital Medications  Current Facility-Administered Medications   Medication Dose Route Frequency Provider Last Rate Last Admin    pantoprazole (PROTONIX) tablet 40 mg  40 mg Oral QAM AC David Soriano, APRN - CNP        guaiFENesin tablet 400 mg  400 mg Oral Daily Lars Junior APRN - CNS        albuterol (PROVENTIL) (2.5 MG/3ML) 0.083% nebulizer solution 2.5 mg  2.5 mg Nebulization 4x Daily PRN Bandar Jackson MD        HYDROcodone homatropine (HYCODAN) 5-1.5 MG/5ML solution 5 mL  5 mL Oral Q4H PRN Mica Gonzales DO   5 mL at 24 0629    doxycycline hyclate (VIBRAMYCIN) capsule 100 mg  100 mg Oral BID Bandar Jackson MD   100 mg at 24 0824    azithromycin (ZITHROMAX) 500 mg in sodium chloride 0.9 % 250 mL IVPB ([id number])  500 mg IntraVENous Q24H Bandar Jackson MD   Stopped at 24 1550    bumetanide (BUMEX) 12.5 mg in sodium chloride 0.9 % 125 mL infusion  0.5 mg/hr IntraVENous Continuous Jyotsna Lugo APRN - CNP 5 mL/hr at 24 0238 0.5 mg/hr at 24 0238    prochlorperazine (COMPAZINE) injection 10 mg  10 mg IntraVENous Q6H PRN Tanner Van MD        piperacillin-tazobactam (ZOSYN) 3,375 mg in sodium chloride 
Internal Medicine Progress Note    Patient's name: David Moyer  : 1965  Chief complaints (on day of admission): Shortness of Breath (Patient sent by PCP for CHF and bilateral leg swelling )  Admission date: 2024  Date of service: 2024   Room: [address] IMCU/NEURO  Primary care physician: Vern Jacques MD  Reason for visit: Follow-up for shortness of breath     Subjective  David was seen and examined returning to bed from the restroom. Patient this am had a coughing fit which caused pain in his abdomen. He denies any fevers or chills. Has been urinating frequently on bumex drip.     Review of Systems  There are no new complaints of chest pain, shortness of breath,  nausea, vomiting, diarrhea, constipation unless otherwise mentioned above.     Hospital Medications  Current Facility-Administered Medications   Medication Dose Route Frequency Provider Last Rate Last Admin    HYDROcodone homatropine (HYCODAN) 5-1.5 MG/5ML solution 5 mL  5 mL Oral Q4H PRN Mica Gonzales DO        bumetanide (BUMEX) 12.5 mg in sodium chloride 0.9 % 125 mL infusion  0.5 mg/hr IntraVENous Continuous Shamika Roa APRN - CNP 5 mL/hr at 24 1503 0.5 mg/hr at 24 1503    albumin human 25% IV solution 25 g  25 g IntraVENous Q8H Shamika Roa APRN -  mL/hr at 24 0544 25 g at 24 0544    prochlorperazine (COMPAZINE) injection 10 mg  10 mg IntraVENous Q6H PRN Tanner Van MD        piperacillin-tazobactam (ZOSYN) 3,375 mg in sodium chloride 0.9 % 50 mL IVPB ([id number])  3,375 mg IntraVENous Q8H Tanner Van MD   Stopped at 24    ipratropium 0.5 mg-albuterol 2.5 mg (DUONEB) nebulizer solution 1 Dose  1 Dose Inhalation Q4H WA Arlin Sullivan APRN - CNP   1 Dose at 24    zolpidem (AMBIEN) tablet 5 mg  5 mg Oral Nightly PRN Mica Gonzales DO   5 mg at 24    calcium carbonate (TUMS) chewable tablet 500 mg  500 mg Oral Once Shamika Roa 
[facility]  [facility]  Progress Note      Chief Complaint   Patient presents with    Shortness of Breath     Patient sent by PCP for CHF and bilateral leg swelling        SUBJECTIVE:    Patient is tolerating medications. No reported adverse drug reactions.  No nausea, vomiting, diarrhea.    Review of systems:  As stated above in the chief complaint, otherwise negative.    Medications:  Scheduled Meds:   [START ON 2024] guaiFENesin  400 mg Oral Daily    doxycycline hyclate  100 mg Oral BID    azithromycin  500 mg IntraVENous Q24H    albuterol  2.5 mg Nebulization 4x Daily RT    piperacillin-tazobactam  3,375 mg IntraVENous Q8H    calcium carbonate  500 mg Oral Once    sodium chloride flush  10 mL IntraVENous 2 times per day    enoxaparin  30 mg SubCUTAneous BID    [Held by provider] aspirin  81 mg Oral Daily    insulin lispro  0-8 Units SubCUTAneous TID WC    insulin lispro  0-4 Units SubCUTAneous Nightly     Continuous Infusions:   bumetanide (BUMEX) 12.5 mg in sodium chloride 0.9 % 125 mL infusion 0.25 mg/hr (24 1825)    sodium chloride      dextrose       PRN Meds:HYDROcodone homatropine, prochlorperazine, zolpidem, sodium chloride flush, sodium chloride, potassium chloride **OR** potassium alternative oral replacement **OR** potassium chloride, ondansetron **OR** ondansetron, senna, acetaminophen **OR** acetaminophen, albuterol, glucose, dextrose bolus **OR** dextrose bolus, glucagon (rDNA), dextrose, hydrOXYzine, melatonin    OBJECTIVE:  /69   Pulse (!) 104   Temp 98.1 °F (36.7 °C) (Oral)   Resp 20   Ht 1.88 m (6' 2\")   Wt 132.7 kg (292 lb 9.6 oz)   SpO2 93%   BMI 37.57 kg/m²   Temp  Av.3 °F (36.8 °C)  Min: 98.1 °F (36.7 °C)  Max: 98.4 °F (36.9 °C)  Constitutional: The patient is awake, alert, and oriented.   Skin: Warm and dry. No rashes were noted. No jaundice.  HEENT: Eyes show round, and reactive pupils. Moist mucous membranes, no ulcerations, no 
Message sent to Dr Paul to see if pt can dc from her stand point  
Message sent to Dr Van to see if pt can have anything in addition for nausea in between zofran  Per Dr Van order Compazine 10 iv q6prn and notify GI and gen surg  Perfect serve sent to Dr Trejo and sroc regarding above  
Messaged David NP w/Pulm re: pt request for nebulizer at d/c    Messaged Dr. Van per pt request for ambien at d/c  
Messaged Dr. Honeycutt to see if he is able to come talk to patient and family.   
Messaged Dr. Honeycutt to see if patient able to discharge.     Okay to discharge from pulmonology standpoint.   
Messaged Dr. Jackson to notify that no one is in pathology lab today.   
Messaged Dr. Paul to see if patient able to discharge     Okay to discharge from Dr. Paul standpoint     Hold discharge until 7/4  
Messaged Dr. Paul w/Nephro to see if ok to d/c from their POV  1049 Per Dr. Paul ok to d/c from their POV      
Messaged Landy Nguyen to see if patient able to discharge   
Messaged [facility] per family request to talk to Dr. Luevano    [facility] stated Dr. Quijano already rounded, he will come talk to family if he is able.  
Messaged Ml Crook to see if patient able to discharge    Okay to  discharge from hem/onc standpoint   
[facility]  [facility]  Progress Note      Chief Complaint   Patient presents with    Shortness of Breath     Patient sent by PCP for CHF and bilateral leg swelling        SUBJECTIVE:    Patient is tolerating medications. No reported adverse drug reactions.  No nausea, vomiting, diarrhea.    Review of systems:  As stated above in the chief complaint, otherwise negative.    Medications:  Scheduled Meds:   metOLazone  2.5 mg Oral BID    albumin human 25%  25 g IntraVENous Q8H    azithromycin  500 mg Oral Daily    pantoprazole  40 mg Oral QAM AC    guaiFENesin  400 mg Oral Daily    doxycycline hyclate  100 mg Oral BID    calcium carbonate  500 mg Oral Once    sodium chloride flush  10 mL IntraVENous 2 times per day    enoxaparin  30 mg SubCUTAneous BID    [Held by provider] aspirin  81 mg Oral Daily    insulin lispro  0-8 Units SubCUTAneous TID WC    insulin lispro  0-4 Units SubCUTAneous Nightly     Continuous Infusions:   bumetanide (BUMEX) 12.5 mg in sodium chloride 0.9 % 125 mL infusion 0.5 mg/hr (24 0645)    sodium chloride      dextrose       PRN Meds:albuterol, HYDROcodone homatropine, prochlorperazine, zolpidem, sodium chloride flush, sodium chloride, potassium chloride **OR** potassium alternative oral replacement **OR** potassium chloride, ondansetron **OR** ondansetron, senna, acetaminophen **OR** acetaminophen, albuterol, glucose, dextrose bolus **OR** dextrose bolus, glucagon (rDNA), dextrose, hydrOXYzine, melatonin    OBJECTIVE:  BP (!) 124/57   Pulse (!) 104   Temp 97.7 °F (36.5 °C) (Oral)   Resp 18   Ht 1.88 m (6' 2.02\")   Wt 134.3 kg (296 lb)   SpO2 90%   BMI 37.99 kg/m²   Temp  Av.3 °F (36.8 °C)  Min: 97.7 °F (36.5 °C)  Max: 98.9 °F (37.2 °C)  Constitutional: The patient is awake, alert, and oriented.   Skin: Warm and dry. No rashes were noted. No jaundice.  HEENT: Eyes show round, and reactive pupils. Moist mucous membranes, no ulcerations, no thrush. 
Notified Dr Hardik jeff 5.5  
Notified Dr. Dye of pt's potassium of 5.5  
Notified Dr. Jackson of path smear results being back  
PROGRESS NOTE        Patient Presents with/Seen in Consultation For      Reason for Consult: Elevated liver chemistries   Chief complaints: SOB  Subjective:     Patient seen sitting on edge of bed feels ok today. No abd pain or nausea. Tolerating diet however decrease appetite. POC d/w pt.     Review of Systems  Aside from what was mentioned in the PMH and HPI, essentially unremarkable, all others negative.    Objective:     /66   Pulse (!) 105   Temp 98.7 °F (37.1 °C) (Oral)   Resp 18   Ht 1.88 m (6' 2\")   Wt 133.2 kg (293 lb 9.6 oz)   SpO2 95%   BMI 37.70 kg/m²     General appearance: alert, awake, sitting on edge of bed and cooperative  Eyes: conjunctiva pale, sclera anicteric. PERRL.  Lungs: clear to auscultation bilaterally, O2 nasal cannula  Heart: regular rate and rhythm, no murmur, 2+ pulses; 2+ edema  Abdomen: soft, non-tender; bowel sounds normal; no masses,  no organomegaly  Extremities: extremities 2+ edema  Pulses: 2+ and symmetric  Skin: Skin color, texture, turgor normal.   Neurologic: Grossly normal    HYDROcodone homatropine (HYCODAN) 5-1.5 MG/5ML solution 5 mL, Q4H PRN  bumetanide (BUMEX) 12.5 mg in sodium chloride 0.9 % 125 mL infusion, Continuous  prochlorperazine (COMPAZINE) injection 10 mg, Q6H PRN  piperacillin-tazobactam (ZOSYN) 3,375 mg in sodium chloride 0.9 % 50 mL IVPB ([id number]), Q8H  ipratropium 0.5 mg-albuterol 2.5 mg (DUONEB) nebulizer solution 1 Dose, Q4H WA RT  zolpidem (AMBIEN) tablet 5 mg, Nightly PRN  calcium carbonate (TUMS) chewable tablet 500 mg, Once  sodium bicarbonate tablet 650 mg, BID  sodium chloride flush 0.9 % injection 10 mL, 2 times per day  sodium chloride flush 0.9 % injection 10 mL, PRN  0.9 % sodium chloride infusion, PRN  potassium chloride (KLOR-CON M) extended release tablet 40 mEq, PRN   Or  potassium bicarb-citric acid (EFFER-K) effervescent tablet 40 mEq, PRN   Or  potassium chloride 10 mEq/100 mL IVPB (Peripheral Line), PRN  enoxaparin Sodium 
Patient politely declined blood sugar check. Pt stated blood sugar was 195 off of his personal device.   
Patient returned from procedure. Dressing checked, clean, dry, and intact. Patient stable. No s/s of complications noted or reported. Vitals will be checked q 15min, see flow sheets.  
Patient stated blood sugar was 318 off of personal device.  
Patient's blood sugar 211  
Per Dr Paul ok to dc from their stand point  
Prone BP per IR request 127/68  
[facility]  [facility]  Progress Note      Chief Complaint   Patient presents with    Shortness of Breath     Patient sent by PCP for CHF and bilateral leg swelling        SUBJECTIVE:    Patient is tolerating medications. No reported adverse drug reactions.  No nausea, vomiting, diarrhea.    Review of systems:  As stated above in the chief complaint, otherwise negative.    Medications:  Scheduled Meds:   [START ON 2024] pantoprazole  40 mg Oral QAM AC    guaiFENesin  400 mg Oral Daily    doxycycline hyclate  100 mg Oral BID    azithromycin  500 mg IntraVENous Q24H    piperacillin-tazobactam  3,375 mg IntraVENous Q8H    calcium carbonate  500 mg Oral Once    sodium chloride flush  10 mL IntraVENous 2 times per day    enoxaparin  30 mg SubCUTAneous BID    [Held by provider] aspirin  81 mg Oral Daily    insulin lispro  0-8 Units SubCUTAneous TID WC    insulin lispro  0-4 Units SubCUTAneous Nightly     Continuous Infusions:   bumetanide (BUMEX) 12.5 mg in sodium chloride 0.9 % 125 mL infusion 0.5 mg/hr (24 0238)    sodium chloride      dextrose       PRN Meds:albuterol, HYDROcodone homatropine, prochlorperazine, zolpidem, sodium chloride flush, sodium chloride, potassium chloride **OR** potassium alternative oral replacement **OR** potassium chloride, ondansetron **OR** ondansetron, senna, acetaminophen **OR** acetaminophen, albuterol, glucose, dextrose bolus **OR** dextrose bolus, glucagon (rDNA), dextrose, hydrOXYzine, melatonin    OBJECTIVE:  /79   Pulse 96   Temp 98.4 °F (36.9 °C) (Oral)   Resp 18   Ht 1.88 m (6' 2\")   Wt 133.9 kg (295 lb 3.2 oz)   SpO2 92%   BMI 37.90 kg/m²   Temp  Av.9 °F (36.6 °C)  Min: 97.4 °F (36.3 °C)  Max: 98.4 °F (36.9 °C)  Constitutional: The patient is awake, alert, and oriented.   Skin: Warm and dry. No rashes were noted. No jaundice.  HEENT: Eyes show round, and reactive pupils. Moist mucous membranes, no ulcerations, no thrush. 
Pt moved to room 8521A to promote rest and a volume controlled atmosphere.  Pt continues to refuse tele monitoring as pt states \"it makes it very hard for me to sleep\".  Pt self HS BG check per monitor was 167.    Pt stated that he would like to rest as much as possible and that he will call for nurse after 2300 for iv medication, however he does not want to be woken up for medication.    
Pt politely declining blood sugar checks. Pt stated that his blood glucose was 172 from his personal device.   
Pt politely declining finger sticks to check blood glucose.       Pt informed this RN blood glucose reads 202 off personal device.   
Pt stated  from personal device and will treat it with home insulin.   
Pulse ox was 90 % on room air at rest.  Ambulated patient on room air.  Oxygen saturation was  85% on room air while ambulating 200 ft.  Oxygen applied @ 3L  Recovery pulse ox was 94% on 3 liters of oxygen while ambulating 200 ft.  
RT Nebulizer Bronchodilator Protocol Note    There is a bronchodilator order in the chart from a provider indicating to follow the RT Bronchodilator Protocol and there is an “Initiate RT Bronchodilator Protocol” order as well (see protocol at bottom of note).    CXR Findings:  XR CHEST PORTABLE    Result Date: 6/30/2024  There is minimal right basilar atelectasis.       The findings from the last RT Protocol Assessment were as follows:  Smoking: Smoker 15 pack years or more  Respiratory Pattern: Regular pattern and RR 12-20 bpm  Breath Sounds: Clear breath sounds  Cough: Strong, spontaneous, non-productive  Indication for Bronchodilator Therapy: On home bronchodilators  Bronchodilator Assessment Score: 1    Aerosolized bronchodilator medication orders have been revised according to the RT Nebulizer Bronchodilator Protocol below.    Respiratory Therapist to perform RT Therapy Protocol Assessment initially then follow the protocol.  Repeat RT Therapy Protocol Assessment PRN for score 0-3 or on second treatment, BID, and PRN for scores above 3.    No Indications - adjust the frequency to every 6 hours PRN wheezing or bronchospasm, if no treatments needed after 48 hours then discontinue using Per Protocol order mode.     If indication present, adjust the RT bronchodilator orders based on the Bronchodilator Assessment Score as indicated below.  If a patient is on this medication at home then do not decrease Frequency below that used at home.    0-3 - enter or revise RT bronchodilator order(s) to equivalent RT Bronchodilator order with Frequency of every 4 hours PRN for wheezing or increased work of breathing using Per Protocol order mode.       4-6 - enter or revise RT Bronchodilator order(s) to two equivalent RT bronchodilator orders with one order with BID Frequency and one order with Frequency of every 4 hours PRN wheezing or increased work of breathing using Per Protocol order mode.         7-10 - enter or revise RT 
S: Patient seen and examined again. Doing fine overall. Only slightly nauseous. No abdmoinal pain. Passing flatus. + BM.     O: General : NAD   Abd: nontender, non distended    Plan:     Can be seen as OP by Dr. Luevano. Doubt leukocytosis is 2/2 intraabdominal process. Please call back with any questions or concerns.     Electronically signed by Junito Morales DO on 6/28/2024 at 9:20 PM   
Spoke w/ECHO dept- to be completed today  
[facility]  Nephrology Progress Note    Patient's Name: David Moyer    History of Present Illness from 6/25 consult note:    \"David Moyer is a 58 y.o. male with no past medical history available for review. He presented to the ED on 6/25 for concerns of shortness of breath. Vital signs of 6/25 includes temperature 98.1, respirations 18, pulse 108, /62, and he was 94%SPO2. Lab data on 6/25 includes sodium 131, BUN 37, creatinine 2, calcium 8, albumin 2.9, WBC 12.6K, and hemoglobin 12.4. EKG from 6/25 revealed sinus tachycardia and right bundle branch block. He had a chest x-ray on 6/25 which showed no significant change. CT abdomen/pelvis 6/25 showed mild-moderate bilateral perinephric fat stranding, no hydronephrosis. He had a NM VQ scan which showed very low probability for pulmonary arterial embolization.   Nephrology has been consulted to see the patient for RUFINA on CKD with volume overload.  He has a baseline creatinine of 1.2-1.5.   At present, patient was seen and examined.  He explained that he has been having aches and pains in his back and shoulder.  He also notes that he has been having swelling.  He reports that he has not been eating well.  He notes that he was vomiting yesterday.  He explained that he takes Advil occasionally.  He has noticed decreased urine output.  He denies taking any antibiotics recently.  He reports feeling shortness of breath prior to admission.  He denies any chest pain.  He denies any dysuria or hematuria.  He denies any diarrhea.\"    Subjective:    6/26: Patient was seen and examined in the ultrasound department.  He notes vomiting today.    PMH:    No past medical history on file.    No past surgical history on file.    Patient Active Problem List   Diagnosis    Acute coronary syndrome (HCC)    Right bundle branch block    Acute respiratory failure with hypoxia (HCC)       Diet:    ADULT DIET; Regular; Low Sodium (2 gm)    Meds:     metroNIDAZOLE  500 mg 
[facility]  Nephrology Progress Note    Patient's Name: David Moyer    History of Present Illness from 6/25 consult note:    \"David Moyer is a 58 y.o. male with no past medical history available for review. He presented to the ED on 6/25 for concerns of shortness of breath. Vital signs of 6/25 includes temperature 98.1, respirations 18, pulse 108, /62, and he was 94%SPO2. Lab data on 6/25 includes sodium 131, BUN 37, creatinine 2, calcium 8, albumin 2.9, WBC 12.6K, and hemoglobin 12.4. EKG from 6/25 revealed sinus tachycardia and right bundle branch block. He had a chest x-ray on 6/25 which showed no significant change. CT abdomen/pelvis 6/25 showed mild-moderate bilateral perinephric fat stranding, no hydronephrosis. He had a NM VQ scan which showed very low probability for pulmonary arterial embolization.   Nephrology has been consulted to see the patient for RUFINA on CKD with volume overload.  He has a baseline creatinine of 1.2-1.5.   At present, patient was seen and examined.  He explained that he has been having aches and pains in his back and shoulder.  He also notes that he has been having swelling.  He reports that he has not been eating well.  He notes that he was vomiting yesterday.  He explained that he takes Advil occasionally.  He has noticed decreased urine output.  He denies taking any antibiotics recently.  He reports feeling shortness of breath prior to admission.  He denies any chest pain.  He denies any dysuria or hematuria.  He denies any diarrhea.\"    Subjective:    6/28: Patient was seen and examined.  He notes occasional abdominal pain.  He denies any chest pain.    PMH:    No past medical history on file.    No past surgical history on file.    Patient Active Problem List   Diagnosis    Acute coronary syndrome (HCC)    Right bundle branch block    Acute respiratory failure with hypoxia (HCC)    Respiratory distress       Diet:    ADULT DIET; Regular; Low Sodium (2 gm); 
[facility]  Nephrology Progress Note    Patient's Name: David Moyer    History of Present Illness from 6/25 consult note:    \"David Moyer is a 58 y.o. male with no past medical history available for review. He presented to the ED on 6/25 for concerns of shortness of breath. Vital signs of 6/25 includes temperature 98.1, respirations 18, pulse 108, /62, and he was 94%SPO2. Lab data on 6/25 includes sodium 131, BUN 37, creatinine 2, calcium 8, albumin 2.9, WBC 12.6K, and hemoglobin 12.4. EKG from 6/25 revealed sinus tachycardia and right bundle branch block. He had a chest x-ray on 6/25 which showed no significant change. CT abdomen/pelvis 6/25 showed mild-moderate bilateral perinephric fat stranding, no hydronephrosis. He had a NM VQ scan which showed very low probability for pulmonary arterial embolization.   Nephrology has been consulted to see the patient for RUFINA on CKD with volume overload.  He has a baseline creatinine of 1.2-1.5.   At present, patient was seen and examined.  He explained that he has been having aches and pains in his back and shoulder.  He also notes that he has been having swelling.  He reports that he has not been eating well.  He notes that he was vomiting yesterday.  He explained that he takes Advil occasionally.  He has noticed decreased urine output.  He denies taking any antibiotics recently.  He reports feeling shortness of breath prior to admission.  He denies any chest pain.  He denies any dysuria or hematuria.  He denies any diarrhea.\"    Subjective:    6/30: Patient was seen and examined. He is resting in the bed, son and daughter-in-law at the bedside. He reports feeling about the same. He reports worsened shortness of breath at rest today, he is now on oxygen via nasal cannula. Worsened edema to BLE also noted today. He denies any pain. He denies n/v.     PMH:    No past medical history on file.    No past surgical history on file.    Patient Active Problem 
[facility]  Nephrology Progress Note    Patient's Name: David Moyer    History of Present Illness from 6/25 consult note:    \"David Moyer is a 58 y.o. male with no past medical history available for review. He presented to the ED on 6/25 for concerns of shortness of breath. Vital signs of 6/25 includes temperature 98.1, respirations 18, pulse 108, /62, and he was 94%SPO2. Lab data on 6/25 includes sodium 131, BUN 37, creatinine 2, calcium 8, albumin 2.9, WBC 12.6K, and hemoglobin 12.4. EKG from 6/25 revealed sinus tachycardia and right bundle branch block. He had a chest x-ray on 6/25 which showed no significant change. CT abdomen/pelvis 6/25 showed mild-moderate bilateral perinephric fat stranding, no hydronephrosis. He had a NM VQ scan which showed very low probability for pulmonary arterial embolization.   Nephrology has been consulted to see the patient for RUFINA on CKD with volume overload.  He has a baseline creatinine of 1.2-1.5.   At present, patient was seen and examined.  He explained that he has been having aches and pains in his back and shoulder.  He also notes that he has been having swelling.  He reports that he has not been eating well.  He notes that he was vomiting yesterday.  He explained that he takes Advil occasionally.  He has noticed decreased urine output.  He denies taking any antibiotics recently.  He reports feeling shortness of breath prior to admission.  He denies any chest pain.  He denies any dysuria or hematuria.  He denies any diarrhea.\"    Subjective:    7/1: Patient was seen and examined.  He notes that he feels more congested.  He denies any chest pain.  He has a visitor at bedside.    PMH:    No past medical history on file.    No past surgical history on file.    Patient Active Problem List   Diagnosis    Acute coronary syndrome (HCC)    Right bundle branch block    Acute respiratory failure with hypoxia (HCC)    Respiratory distress       Diet:    ADULT 
Upon RRT and RN assessment, patient's oxygen saturation was in 80s. Patient explained he would not wear a nasal cannula, but was agreeable to a mask. Placed patient on 35% with 8L venturi mask. Patient's oxygen saturation is now 94%. RN aware.  
(KLOR-CON M) extended release tablet 40 mEq, PRN   Or  potassium bicarb-citric acid (EFFER-K) effervescent tablet 40 mEq, PRN   Or  potassium chloride 10 mEq/100 mL IVPB (Peripheral Line), PRN  enoxaparin Sodium (LOVENOX) injection 30 mg, BID  ondansetron (ZOFRAN-ODT) disintegrating tablet 4 mg, Q8H PRN   Or  ondansetron (ZOFRAN) injection 4 mg, Q6H PRN  senna (SENOKOT) tablet 8.6 mg, Daily PRN  acetaminophen (TYLENOL) tablet 650 mg, Q6H PRN   Or  acetaminophen (TYLENOL) suppository 650 mg, Q6H PRN  albuterol (PROVENTIL) (2.5 MG/3ML) 0.083% nebulizer solution 2.5 mg, 4x Daily PRN  [Held by provider] aspirin chewable tablet 81 mg, Daily  insulin lispro (HUMALOG,ADMELOG) injection vial 0-8 Units, TID WC  insulin lispro (HUMALOG,ADMELOG) injection vial 0-4 Units, Nightly  glucose chewable tablet 16 g, PRN  dextrose bolus 10% 125 mL, PRN   Or  dextrose bolus 10% 250 mL, PRN  glucagon injection 1 mg, PRN  dextrose 10 % infusion, Continuous PRN  hydrOXYzine (VISTARIL) injection 25 mg, Nightly PRN  melatonin tablet 6 mg, Nightly PRN         Data Review  CBC:   Lab Results   Component Value Date/Time    WBC 11.6 07/02/2024 06:11 AM    RBC 4.40 07/02/2024 06:11 AM    HGB 13.4 07/02/2024 06:11 AM    HCT 42.3 07/02/2024 06:11 AM    MCV 96.1 07/02/2024 06:11 AM    MCH 30.5 07/02/2024 06:11 AM    MCHC 31.7 07/02/2024 06:11 AM    RDW 18.2 07/02/2024 06:11 AM     07/02/2024 06:11 AM    MPV 10.7 07/02/2024 06:11 AM     CMP:    Lab Results   Component Value Date/Time     07/02/2024 06:11 AM    K 4.1 07/02/2024 06:11 AM     07/02/2024 06:11 AM    CO2 23 07/02/2024 06:11 AM    BUN 45 07/02/2024 06:11 AM    CREATININE 2.1 07/02/2024 06:11 AM    GFRAA >60 07/06/2022 11:12 AM    LABGLOM 36 07/02/2024 06:11 AM    LABGLOM >60 10/15/2023 05:47 AM    GLUCOSE 207 07/02/2024 06:11 AM    CALCIUM 8.2 07/02/2024 06:11 AM    BILITOT 0.5 07/02/2024 06:11 AM    ALKPHOS 99 07/02/2024 06:11 AM    AST 59 07/02/2024 06:11 AM    ALT 47 
Low Potassium (Less than 3000 mg/day)    Meds:     sodium zirconium cyclosilicate  10 g Oral Once    metroNIDAZOLE  500 mg IntraVENous Q8H    cefTRIAXone (ROCEPHIN) IV  2,000 mg IntraVENous Q24H    calcium carbonate  500 mg Oral Once    sodium bicarbonate  650 mg Oral BID    bumetanide  1 mg IntraVENous BID    sodium chloride flush  10 mL IntraVENous 2 times per day    enoxaparin  30 mg SubCUTAneous BID    aspirin  81 mg Oral Daily    insulin lispro  0-8 Units SubCUTAneous TID WC    insulin lispro  0-4 Units SubCUTAneous Nightly        sodium chloride      dextrose         Meds prn:     zolpidem, sodium chloride flush, sodium chloride, potassium chloride **OR** potassium alternative oral replacement **OR** potassium chloride, ondansetron **OR** ondansetron, senna, acetaminophen **OR** acetaminophen, albuterol, glucose, dextrose bolus **OR** dextrose bolus, glucagon (rDNA), dextrose, hydrOXYzine, melatonin    Meds prior to admission:     No current facility-administered medications on file prior to encounter.     Current Outpatient Medications on File Prior to Encounter   Medication Sig Dispense Refill    Insulin Degludec (TRESIBA FLEXTOUCH) 200 UNIT/ML SOPN Inject 100 Units into the skin daily      fenofibric acid (TRILIPIX) 135 MG CPDR capsule Take 1 capsule by mouth daily      Icosapent Ethyl (VASCEPA) 1 g CAPS capsule Take 2 capsules by mouth daily      Multiple Vitamins-Minerals (THERAPEUTIC MULTIVITAMIN-MINERALS) tablet Take 1 tablet by mouth daily      polycarbophil (FIBERCON) 625 MG tablet Take 1,350 mg by mouth daily      Dulaglutide (TRULICITY) 3 MG/0.5ML SOPN Inject 3 mg into the skin once a week Given Sunday      insulin lispro, 1 Unit Dial, (HUMALOG KWIKPEN) 100 UNIT/ML SOPN Inject 0-25 Units into the skin 3 times daily (before meals) *Per Sliding Scale*      valACYclovir (VALTREX) 500 MG tablet Take 1 tablet by mouth daily as needed      Glucosamine-Chondroitin (OSTEO BI-FLEX REGULAR STRENGTH PO) Take 2 
laboratory findings as well as reviewed all pertinent provider documentation.  I discussed the case with the resident and I agree with the assessment and plan. Patient requsted to discuss changing surgery date with Dr. Luevano. All questions answered. Awaiting workup for anaplasmosis with ID. Dr. Luevano to see patient tomorrow.      - YOSVANY Quijano MD    
mg  1 Dose Inhalation Q4H WA RT    calcium carbonate  500 mg Oral Once    sodium bicarbonate  650 mg Oral BID    sodium chloride flush  10 mL IntraVENous 2 times per day    enoxaparin  30 mg SubCUTAneous BID    aspirin  81 mg Oral Daily    insulin lispro  0-8 Units SubCUTAneous TID WC    insulin lispro  0-4 Units SubCUTAneous Nightly       Physical Exam:  General Appearance: appears comfortable in no acute distress.   HEENT: Normocephalic atraumatic without obvious abnormality   Neck: Lips, mucosa, and tongue normal.  Supple, symmetrical, trachea midline, no adenopathy;thyroid:  no enlargement/tenderness/nodules or JVD.  Lung: Breath sounds diminished right lung base . Respirations   unlabored. Symmetrical expansion.  Heart: RRR, normal S1, S2. No MRG  Abdomen: Soft, NT, ND. BS present x 4 quadrants. No bruit or organomegaly.   Extremities: 2+ edema BLE   Musculokeletal: No joint swelling, no muscle tenderness. ROM normal in all joints of extremities.   Neurologic: Mental status: Alert and Oriented X3 .    Pertinent/ New Labs and Imaging Studies     Imaging personally reviewed:  CXR 6/25/24:  FINDINGS:  No significant change.  Limited single view chest with low lung volumes and  large body habitus.  Chronic elevation of the right hemidiaphragm and with  right basilar chronic atelectasis/scarring.  No pulmonary consolidation or  acute pulmonary infiltrate.  Left midlung calcified granuloma, unchanged.  The heart appears normal in size.  No vascular congestion or significant  pleural effusion.     IMPRESSION:  1.  No significant change.  Low lung volumes, chronic elevation of the right  hemidiaphragm, and right basilar chronic atelectasis/scarring.     2.  No pneumonia or acute infiltrate identified.       Non contrast chest ct 6/25/24:  FINDINGS:  Mediastinum: Thyroid is homogeneous in attenuation. No bulky mediastinal  adenopathy. Central airways are patent. Esophagus with normal course and  caliber.  Cardiac size 
into the skin once a week Given Sunday      insulin lispro, 1 Unit Dial, (HUMALOG KWIKPEN) 100 UNIT/ML SOPN Inject 0-25 Units into the skin 3 times daily (before meals) *Per Sliding Scale*      valACYclovir (VALTREX) 500 MG tablet Take 1 tablet by mouth daily as needed      Glucosamine-Chondroitin (OSTEO BI-FLEX REGULAR STRENGTH PO) Take 2 tablets by mouth daily      Finerenone (KERENDIA) 20 MG TABS Take 1 tablet by mouth daily      sildenafil (VIAGRA) 50 MG tablet Take 1 tablet by mouth daily as needed for Erectile Dysfunction      aspirin 81 MG chewable tablet Take 1 tablet by mouth daily      atorvastatin (LIPITOR) 40 MG tablet Take 1 tablet by mouth daily      valsartan (DIOVAN) 320 MG tablet Take 1 tablet by mouth daily      dapagliflozin (FARXIGA) 10 MG tablet Take 1 tablet by mouth every morning      albuterol sulfate HFA (VENTOLIN HFA) 108 (90 Base) MCG/ACT inhaler Inhale 2 puffs into the lungs 4 times daily as needed for Wheezing 18 g 0       Allergies:    Patient has no known allergies.    Social History:     reports that he has never smoked. He has never used smokeless tobacco.    Family History:     No family history on file.    Physical Exam:      Patient Vitals for the past 24 hrs:   BP Temp Temp src Pulse Resp SpO2 Height Weight   07/03/24 0615 -- -- -- -- -- -- -- 134.3 kg (296 lb)   07/02/24 1900 (!) 124/57 98.2 °F (36.8 °C) Tympanic (!) 104 18 90 % -- --   07/02/24 1519 (!) 148/88 98.9 °F (37.2 °C) Oral (!) 101 18 92 % -- --   07/02/24 1430 (!) 150/82 -- -- (!) 109 18 91 % -- --   07/02/24 1415 (!) 144/58 -- -- (!) 102 24 93 % -- --   07/02/24 1400 118/60 -- -- (!) 102 24 93 % -- --   07/02/24 1345 (!) 143/62 -- -- (!) 102 24 94 % -- --   07/02/24 1338 (!) 147/79 -- -- (!) 109 (!) 33 93 % -- --   07/02/24 1332 120/73 -- -- (!) 107 26 99 % -- --   07/02/24 1327 (!) 147/68 -- -- (!) 102 27 99 % -- --   07/02/24 1322 137/84 -- -- (!) 102 27 100 % -- --   07/02/24 1317 (!) 152/75 -- -- (!) 102 25 100 % 
07/01/2024 05:15 AM    BUN 41 07/01/2024 05:15 AM    CREATININE 2.4 07/01/2024 05:15 AM    CALCIUM 8.2 07/01/2024 05:15 AM    GFRAA >60 07/06/2022 11:12 AM    LABGLOM 31 07/01/2024 05:15 AM    LABGLOM >60 10/15/2023 05:47 AM     Lab Results   Component Value Date/Time    PROTIME 13.2 06/29/2024 06:06 AM    INR 1.2 06/29/2024 06:06 AM     No results for input(s): \"PROBNP\" in the last 72 hours.       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  RV mildly dilated.  Unable to assess RVSP due to insignificant TR   Metabolic acidosis    Plan:   Oxygen 4 L nasal cannula wean to keep greater than 92% O2 testing prior to discharge  NIV  at HS and as needed to trial CPAP 8 during inpatient stay . Likely underlying ADRIANA. Discussed outpatient testing for ADRIANA, pt agreeable. Orders placed for home study upon dc.   continue antibiotics - zosyn per  ID recommendations, porcelain GB, plans for outpatient intervention   Azithromycin and Doxy per ID recommendations    Pathology smear to rule out anaplasmosis/babesiosis  Anaplasmosis/babesiosis serology and PCR sent out  Albuterol as needed EZ 
10.7 07/02/2024 06:11 AM     Lab Results   Component Value Date/Time     07/02/2024 06:11 AM    K 4.1 07/02/2024 06:11 AM     07/02/2024 06:11 AM    CO2 23 07/02/2024 06:11 AM    BUN 45 07/02/2024 06:11 AM    CREATININE 2.1 07/02/2024 06:11 AM    CALCIUM 8.2 07/02/2024 06:11 AM    GFRAA >60 07/06/2022 11:12 AM    LABGLOM 36 07/02/2024 06:11 AM    LABGLOM >60 10/15/2023 05:47 AM     Lab Results   Component Value Date/Time    PROTIME 12.2 07/02/2024 08:54 AM    INR 1.1 07/02/2024 08:54 AM     No results for input(s): \"PROBNP\" in the last 72 hours.       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  RV mildly dilated.  Unable to assess RVSP due to insignificant TR   Metabolic acidosis    Plan:   Oxygen weaned off keep SpO2 greater than 92%  Dc CPAP and OP sleep study orders. Pt refusing  Abx Zosyn, Azithromycin and Doxy per ID recommendations    Pathology smear to rule out anaplasmosis/babesiosis send out lab pending  Albuterol as needed EZ Pap every 4 hours. Stop chest vest  Guaifenesin 3 times daily  Incentive spirometry, flutter valve.  Follow chest x-ray 6/30/2024  2D 
Value Date    WBC 17.6 (H) 07/01/2024    HGB 13.7 07/01/2024    HCT 41.9 07/01/2024     07/01/2024     07/01/2024    K 4.6 07/01/2024     07/01/2024    CREATININE 2.4 (H) 07/01/2024    BUN 41 (H) 07/01/2024    CO2 21 (L) 07/01/2024    GLUCOSE 168 (H) 07/01/2024    ALT 69 (H) 07/01/2024    AST 89 (H) 07/01/2024    INR 1.2 06/29/2024    APTT 40.3 (H) 10/15/2023    TSH 1.86 06/25/2024    LABA1C 6.5 (H) 06/26/2024       XR CHEST PORTABLE   Final Result   There is minimal right basilar atelectasis.         XR CHEST PORTABLE   Final Result   There are persistent bibasilar parenchymal densities suggestive of   atelectasis.         Vascular duplex lower extremity venous bilateral   Final Result   No evidence of DVT in either lower extremity.         US GALLBLADDER RUQ   Final Result   Limited evaluation of the gallbladder secondary to echogenic, shadowing   diffuse calcification of the gallbladder wall and intraluminal calcified   gallstones.  Findings are consistent with porcelain gallbladder.         NM LUNG VENT/PERFUSION (VQ)   Final Result   Very low probability for pulmonary arterial embolization.         CT ABDOMEN PELVIS WO CONTRAST Additional Contrast? None   Final Result   1. Subsegmental consolidation right lung base with air bronchograms.   Subsegmental consolidation versus scarring base right middle lobe.   2. Porcelain gallbladder with pulse pole calculi in dependent gallbladder   lumen.   3. 5.5 cm periumbilical anterior abdominal wall defect containing fat and   small bowel.  No CT evidence of incarceration or obstruction.   4. Mild to moderate bilateral perinephric fat stranding. Correlate with   urinalysis.  No calculi or hydronephrosis.   5. Bilateral edematous change, greatest in posterior abdominopelvic side wall.   6. Sequelae of prior granulomatous disease.         CT CHEST WO CONTRAST   Final Result   1. Asymmetric elevation the right hemidiaphragm with adjacent primarily   linear 
2.1 today    CT abdomen 6/25-mild-moderate bilateral perinephric fat stranding, no hydro  FENa 0.1%-support neurology  C3 114 and C4 30 on 6/27 - WNL  JOSEFINA (-)  Prot:Cr ratio 0.11  Sed rate 16 on 6/27  SPEP and UPEP (-) for monoclonal protein   Avoid nephrotoxins/NSAIDs  Strict I&O, daily weights  renal biopsy 7/2   Bumex drip  Monitor labs    2.  Shortness of breath  EKG 6/25-sinus tachycardia and RBBB  CXR 6/25-no significant change  NM VQ scan very low probability for PE  Further workup as per primary    3.  Leukocytosis  Concern for anaplasmosis/babesiosis  On Doxy/azithromycin  ID and oncology/hematology following    4.  Hypocalcemia with hypoalbuminemia  Likely secondary to poor oral intake  Supplement calcium as needed  Monitor labs    5.  Hyperkalemia - resolved   Likely in the setting of RUFINA/CKD  Low potassium diet  Monitor labs    6. BLE edema  US 6/26-no evidence of DVT  Bumex drip  Monitor volume status    Kristyn Roa APRN - CNP  Pt seen and examined on rounds with kristyn bond  Agree with above  For kidney bx today  Continue diuresis  Follow cr  Henri Paul MD     
atelectasis although mild confluence could   represent intermixed bronchopneumonia in the right mid and lower lung without   pleural effusion.  No isolated nodule or mass.   2. Please see concurrent but separate CT abdomen and pelvis dictation for   intra-abdominal and intrapelvic findings.         XR CHEST PORTABLE   Final Result   1.  No significant change.  Low lung volumes, chronic elevation of the right   hemidiaphragm, and right basilar chronic atelectasis/scarring.      2.  No pneumonia or acute infiltrate identified.             Echocardiogram       Assessment   Active Hospital Problems    Diagnosis     Respiratory distress [R06.03]     Acute respiratory failure with hypoxia (HCC) [J96.01]          Plan  Right lower lobe atelectasis   Abx- Zosyn, azithromycin and doxycycline per ID.    ID working up for tick borne illness.   Procal 0.59 in the setting of CKD   Pulm and Infectious disease input appreciated   Procalcitonin increased 0.70 from 0.46  Strep pneumonia antigen negative  Legionella Pneumonia testing negative       Dyspnea   Prior cardiac workup noted, stress test   Pulm input appreciated  Cardio input appreciated     Acute hypoxic respiratory failure  On 4 liters nasal cannula  Discussed with pulm     Probable ADRIANA  Needs op testing and follow up   Resistant to this prospect   Would help with his diaphragm issue as well   Pulm input appreciated.      CKD with volume overload   Baseline around 2  Nephrology on board   Need Is and Os strictly monitored   Diuresis per nephro   Kidney biopsy this week.     Porcelain GB  Surgery on board   Planning for op cholecystectomy    Transaminitis   GI consultation appreciate recs     Medications, labs and imaging reviewed   Discharge plan: TBD pending clinical improvement     Mica Gonzales DO    Electronically signed by Mica Gonzales DO on 6/30/2024 at 7:32 AM    I can be reached through Answering service.    
perinephric fat stranding. Correlate with   urinalysis.  No calculi or hydronephrosis.   5. Bilateral edematous change, greatest in posterior abdominopelvic side wall.   6. Sequelae of prior granulomatous disease.         CT CHEST WO CONTRAST   Final Result   1. Asymmetric elevation the right hemidiaphragm with adjacent primarily   linear opacifications favoring atelectasis although mild confluence could   represent intermixed bronchopneumonia in the right mid and lower lung without   pleural effusion.  No isolated nodule or mass.   2. Please see concurrent but separate CT abdomen and pelvis dictation for   intra-abdominal and intrapelvic findings.         XR CHEST PORTABLE   Final Result   1.  No significant change.  Low lung volumes, chronic elevation of the right   hemidiaphragm, and right basilar chronic atelectasis/scarring.      2.  No pneumonia or acute infiltrate identified.             Echocardiogram       Assessment   Active Hospital Problems    Diagnosis     Respiratory distress [R06.03]     Acute respiratory failure with hypoxia (HCC) [J96.01]          Plan  RUFINA on CKD with volume overload   Baseline around 1.4- 2   Nephrology on board   Need Is and Os strictly monitored   Diuresis per nephro   Renal biopsy today     Hypoxia   Wean NC O2 as tolerated   Appreciate pulm recs     Probable ADRIANA  Pulm consult     Porcelain GB  Surgery on board   Planning for op cholecystectomy  Surgery signing off     Transaminitis   Monitor   Improving   GI signing off     Paralyzed R hemidiaphragm  Diagnosed 3 years ago   Spoke with pulm   Consider NIV defer to pulm   Op fu     Medications, labs and imaging reviewed   Discharge plan: plan to DC tomorrow barring vast set backs     Electronically signed by Tanner Van MD on 7/3/2024 at 2:19 PM    I can be reached through [employer].   
results, RV dysfunction Echo from [facility] 2022 report reviewed, showed mild RV enlargement  DuoNebs q4h WA --STOPPED , added mucinex, changed to daily per patient request , albuterol , chest vest   Lung recruitment with incentive spirometer, flutter valve, EZPAP  Respiratory cx if able   Patient inquiring about diaphragmatic pacer -evaluation as OP    This plan of care was reviewed in collaboration with Dr. Honeycutt     Electronically signed by JOHN Salinas on 6/30/2024 at 10:30 AM            
Leukocytosis  WBC 12.6 on admission --> 14.8 today  Blood cultures - no growth so far  Await respiratory culture  Await pathology smear to r/o anaplasmosis/babesiosis, per ID recs  Abx per ID  ID consulted         JOHN Fournier - CNP    Patient seen and examined all key components of the physical performed independently , case discussed with NP, all pertinent labs and radiologic tests personally reviewed agree with above.   Normal sed rate  JOSEFINA negative; will check ANCA titer for completeness  Leukocytosis, raises possibility of infectious etiology  Tick borne disease being ruled  ID input noted  Possible renal biopsy early next week; ASA held  Continue diuresis     Roldan Dye MD        
right lung base with air bronchograms. Subsegmental consolidation versus scarring base right middle lobe. Porcelain gallbladder with pulse pole calculi in dependent gallbladder lumen. 5.5 cm periumbilical anterior abdominal wall defect containing fat and small bowel.  No CT evidence of incarceration or obstruction. Mild to moderate bilateral perinephric fat stranding. No calculi or hydronephrosis. Bilateral edematous change, greatest in posterior abdominopelvic side wall. Sequelae of prior granulomatous disease.  - CT chest showed asymmetric elevation the right hemidiaphragm with adjacent primarily linear opacifications favoring atelectasis although mild confluence could represent intermixed bronchopneumonia in the right mid and lower lung without pleural effusion.  No isolated nodule or mass  - VQ scan with Very low probability for pulmonary arterial embolization  - US gallbladder showed findings are consistent with porcelain gallbladder    - Pulmonology following for pneumonia patient is on IV antibiotics with azithromycin and IV doxycycline  - GI was consulted for elevated liver enzymes which are improving. Iron studies with iron 96, TIBC 163, iron sat 59 %, and ferritin 6,048; hemochromatosis panel ordered  - EGD/ colonoscopy to be done as outpatient. Viral hepatitis screen negative  - ID following for leukocytosis. Patient had recent travel to [place] with exposure to ticks. Anaplasmosis/babesiosis serology and PCR sent out.  Peripheral smear review pending.   - Nephrology following for RUFINA SPEP and UPEP negative for monoclonal protein for possible kidney bx.   - General surgery following for porcelain gallbladder patient is for OP elective lap johann      - CBC with WBC 17.6, ANC WNL, ALC 12.32, AMC 2.29. Hgb and platelets are WNL. On 6/25 1% blasts were seen on the differential. Yesterday 12% myelocytes were on differential  - He has no skin findings or neuropathy. Will follow renal biopsy. SPEP/UPEP

## 2024-07-08 LAB — CRYOGLOB SER-MCNC: NEGATIVE MG/DL

## 2024-07-30 ENCOUNTER — TELEPHONE (OUTPATIENT)
Dept: ADMINISTRATIVE | Age: 59
End: 2024-07-30

## 2024-08-08 ENCOUNTER — HOSPITAL ENCOUNTER (OUTPATIENT)
Age: 59
Discharge: HOME | End: 2024-08-10

## 2024-08-15 ENCOUNTER — OFFICE VISIT (OUTPATIENT)
Dept: CARDIOLOGY CLINIC | Age: 59
End: 2024-08-15
Payer: COMMERCIAL

## 2024-08-15 VITALS
SYSTOLIC BLOOD PRESSURE: 102 MMHG | WEIGHT: 268 LBS | HEART RATE: 88 BPM | BODY MASS INDEX: 34.39 KG/M2 | DIASTOLIC BLOOD PRESSURE: 60 MMHG | HEIGHT: 74 IN | RESPIRATION RATE: 18 BRPM

## 2024-08-15 DIAGNOSIS — I24.9 ACUTE CORONARY SYNDROME (HCC): Primary | ICD-10-CM

## 2024-08-15 PROCEDURE — 99204 OFFICE O/P NEW MOD 45 MIN: CPT | Performed by: INTERNAL MEDICINE

## 2024-08-15 PROCEDURE — 93000 ELECTROCARDIOGRAM COMPLETE: CPT | Performed by: INTERNAL MEDICINE

## 2024-08-15 RX ORDER — VALSARTAN 320 MG/1
320 TABLET ORAL DAILY
COMMUNITY

## 2024-08-15 ASSESSMENT — ENCOUNTER SYMPTOMS
SHORTNESS OF BREATH: 0
CHEST TIGHTNESS: 0
BACK PAIN: 0
BLOOD IN STOOL: 0
ABDOMINAL PAIN: 0
VOMITING: 0

## 2024-08-15 NOTE — PROGRESS NOTES
Last Year: No      No Known Allergies        Current Outpatient Medications:     albuterol (PROVENTIL) (2.5 MG/3ML) 0.083% nebulizer solution, Take 3 mLs by nebulization 4 times daily as needed for Wheezing, Disp: 120 each, Rfl: 3    metOLazone (ZAROXOLYN) 2.5 MG tablet, Take 1 tablet by mouth 2 times daily for 7 days, Disp: 14 tablet, Rfl: 0    guaiFENesin 400 MG tablet, Take 1 tablet by mouth daily, Disp: 56 tablet, Rfl: 0    bumetanide (BUMEX) 1 MG tablet, Take 1 tablet by mouth 2 times daily, Disp: 60 tablet, Rfl: 3    pantoprazole (PROTONIX) 40 MG tablet, Take 1 tablet by mouth every morning (before breakfast), Disp: 30 tablet, Rfl: 3    Respiratory Therapy Supplies (FULL KIT NEBULIZER SET) MISC, Use as directed with nebulized medication., Disp: 1 each, Rfl: 0    Insulin Degludec (TRESIBA FLEXTOUCH) 200 UNIT/ML SOPN, Inject 100 Units into the skin daily, Disp: , Rfl:     fenofibric acid (TRILIPIX) 135 MG CPDR capsule, Take 1 capsule by mouth daily, Disp: , Rfl:     Icosapent Ethyl (VASCEPA) 1 g CAPS capsule, Take 2 capsules by mouth daily, Disp: , Rfl:     Multiple Vitamins-Minerals (THERAPEUTIC MULTIVITAMIN-MINERALS) tablet, Take 1 tablet by mouth daily, Disp: , Rfl:     polycarbophil (FIBERCON) 625 MG tablet, Take 1,350 mg by mouth daily, Disp: , Rfl:     Dulaglutide (TRULICITY) 3 MG/0.5ML SOPN, Inject 3 mg into the skin once a week Given Sunday, Disp: , Rfl:     insulin lispro, 1 Unit Dial, (HUMALOG KWIKPEN) 100 UNIT/ML SOPN, Inject 0-25 Units into the skin 3 times daily (before meals) *Per Sliding Scale*, Disp: , Rfl:     Glucosamine-Chondroitin (OSTEO BI-FLEX REGULAR STRENGTH PO), Take 2 tablets by mouth daily, Disp: , Rfl:     Finerenone (KERENDIA) 20 MG TABS, Take 1 tablet by mouth daily, Disp: , Rfl:     sildenafil (VIAGRA) 50 MG tablet, Take 1 tablet by mouth daily as needed for Erectile Dysfunction, Disp: , Rfl:     aspirin 81 MG chewable tablet, Take 1 tablet by mouth daily, Disp: , Rfl:

## 2024-08-16 LAB — SURGICAL PATHOLOGY REPORT: NORMAL

## 2024-08-30 ENCOUNTER — HOSPITAL ENCOUNTER (OUTPATIENT)
Age: 59
Discharge: HOME | End: 2024-09-01

## 2024-09-09 LAB — SURGICAL PATHOLOGY REPORT: NORMAL

## 2025-07-22 ENCOUNTER — TELEPHONE (OUTPATIENT)
Dept: CARDIOLOGY CLINIC | Age: 60
End: 2025-07-22